# Patient Record
Sex: MALE | Race: BLACK OR AFRICAN AMERICAN | NOT HISPANIC OR LATINO | Employment: FULL TIME | URBAN - METROPOLITAN AREA
[De-identification: names, ages, dates, MRNs, and addresses within clinical notes are randomized per-mention and may not be internally consistent; named-entity substitution may affect disease eponyms.]

---

## 2017-07-17 DIAGNOSIS — F40.10 SOCIAL PHOBIA: ICD-10-CM

## 2017-07-17 DIAGNOSIS — F41.1 GAD (GENERALIZED ANXIETY DISORDER): ICD-10-CM

## 2017-07-19 ENCOUNTER — TELEPHONE (OUTPATIENT)
Dept: INTERNAL MEDICINE | Facility: CLINIC | Age: 37
End: 2017-07-19

## 2017-07-19 RX ORDER — LORAZEPAM 0.5 MG/1
TABLET ORAL
Qty: 30 TABLET | Refills: 0 | Status: SHIPPED | OUTPATIENT
Start: 2017-07-19 | End: 2018-05-07 | Stop reason: SDUPTHER

## 2017-07-19 NOTE — TELEPHONE ENCOUNTER
----- Message from Chula Ramirez sent at 7/18/2017  9:34 AM CDT -----  Contact: [phone]/ self   Pt its requesting lab work schedule prior to his physical on 09/29/17. Please advise

## 2017-08-31 DIAGNOSIS — F41.1 GAD (GENERALIZED ANXIETY DISORDER): ICD-10-CM

## 2017-09-01 RX ORDER — ESCITALOPRAM OXALATE 20 MG/1
TABLET ORAL
Qty: 30 TABLET | Refills: 0 | Status: SHIPPED | OUTPATIENT
Start: 2017-09-01 | End: 2017-10-02 | Stop reason: SDUPTHER

## 2017-09-21 DIAGNOSIS — Z00.00 ROUTINE GENERAL MEDICAL EXAMINATION AT A HEALTH CARE FACILITY: Primary | ICD-10-CM

## 2017-09-29 ENCOUNTER — LAB VISIT (OUTPATIENT)
Dept: LAB | Facility: HOSPITAL | Age: 37
End: 2017-09-29
Attending: INTERNAL MEDICINE
Payer: COMMERCIAL

## 2017-09-29 DIAGNOSIS — Z00.00 ROUTINE GENERAL MEDICAL EXAMINATION AT A HEALTH CARE FACILITY: ICD-10-CM

## 2017-09-29 LAB
ALBUMIN SERPL BCP-MCNC: 3.7 G/DL
ALP SERPL-CCNC: 54 U/L
ALT SERPL W/O P-5'-P-CCNC: 43 U/L
ANION GAP SERPL CALC-SCNC: 8 MMOL/L
AST SERPL-CCNC: 26 U/L
BASOPHILS # BLD AUTO: 0.02 K/UL
BASOPHILS NFR BLD: 0.5 %
BILIRUB SERPL-MCNC: 0.4 MG/DL
BUN SERPL-MCNC: 17 MG/DL
CALCIUM SERPL-MCNC: 8.9 MG/DL
CHLORIDE SERPL-SCNC: 106 MMOL/L
CHOLEST SERPL-MCNC: 148 MG/DL
CHOLEST/HDLC SERPL: 4.1 {RATIO}
CO2 SERPL-SCNC: 27 MMOL/L
CREAT SERPL-MCNC: 1 MG/DL
DIFFERENTIAL METHOD: ABNORMAL
EOSINOPHIL # BLD AUTO: 0.2 K/UL
EOSINOPHIL NFR BLD: 4.3 %
ERYTHROCYTE [DISTWIDTH] IN BLOOD BY AUTOMATED COUNT: 13.4 %
EST. GFR  (AFRICAN AMERICAN): >60 ML/MIN/1.73 M^2
EST. GFR  (NON AFRICAN AMERICAN): >60 ML/MIN/1.73 M^2
GLUCOSE SERPL-MCNC: 89 MG/DL
HCT VFR BLD AUTO: 40.6 %
HDLC SERPL-MCNC: 36 MG/DL
HDLC SERPL: 24.3 %
HGB BLD-MCNC: 13.7 G/DL
LDLC SERPL CALC-MCNC: 96.8 MG/DL
LYMPHOCYTES # BLD AUTO: 2.3 K/UL
LYMPHOCYTES NFR BLD: 54 %
MCH RBC QN AUTO: 28.1 PG
MCHC RBC AUTO-ENTMCNC: 33.7 G/DL
MCV RBC AUTO: 83 FL
MONOCYTES # BLD AUTO: 0.3 K/UL
MONOCYTES NFR BLD: 7.6 %
NEUTROPHILS # BLD AUTO: 1.4 K/UL
NEUTROPHILS NFR BLD: 33.1 %
NONHDLC SERPL-MCNC: 112 MG/DL
PLATELET # BLD AUTO: 248 K/UL
PMV BLD AUTO: 10.1 FL
POTASSIUM SERPL-SCNC: 4 MMOL/L
PROT SERPL-MCNC: 6.9 G/DL
RBC # BLD AUTO: 4.88 M/UL
SODIUM SERPL-SCNC: 141 MMOL/L
TRIGL SERPL-MCNC: 76 MG/DL
TSH SERPL DL<=0.005 MIU/L-ACNC: 1.07 UIU/ML
WBC # BLD AUTO: 4.22 K/UL

## 2017-09-29 PROCEDURE — 85025 COMPLETE CBC W/AUTO DIFF WBC: CPT

## 2017-09-29 PROCEDURE — 84443 ASSAY THYROID STIM HORMONE: CPT

## 2017-09-29 PROCEDURE — 36415 COLL VENOUS BLD VENIPUNCTURE: CPT | Mod: PO

## 2017-09-29 PROCEDURE — 80053 COMPREHEN METABOLIC PANEL: CPT

## 2017-09-29 PROCEDURE — 80061 LIPID PANEL: CPT

## 2017-10-02 ENCOUNTER — OFFICE VISIT (OUTPATIENT)
Dept: INTERNAL MEDICINE | Facility: CLINIC | Age: 37
End: 2017-10-02
Payer: COMMERCIAL

## 2017-10-02 VITALS
SYSTOLIC BLOOD PRESSURE: 124 MMHG | DIASTOLIC BLOOD PRESSURE: 80 MMHG | HEIGHT: 73 IN | BODY MASS INDEX: 36.02 KG/M2 | HEART RATE: 58 BPM | WEIGHT: 271.81 LBS

## 2017-10-02 DIAGNOSIS — I10 ESSENTIAL HYPERTENSION: ICD-10-CM

## 2017-10-02 DIAGNOSIS — Z00.00 PREVENTATIVE HEALTH CARE: Primary | ICD-10-CM

## 2017-10-02 DIAGNOSIS — F41.1 GAD (GENERALIZED ANXIETY DISORDER): ICD-10-CM

## 2017-10-02 DIAGNOSIS — Z12.5 SCREENING FOR PROSTATE CANCER: ICD-10-CM

## 2017-10-02 DIAGNOSIS — F40.10 SOCIAL PHOBIA: ICD-10-CM

## 2017-10-02 DIAGNOSIS — Z23 NEED FOR INFLUENZA VACCINATION: ICD-10-CM

## 2017-10-02 PROCEDURE — 90688 IIV4 VACCINE SPLT 0.5 ML IM: CPT | Mod: S$GLB,,, | Performed by: INTERNAL MEDICINE

## 2017-10-02 PROCEDURE — 90471 IMMUNIZATION ADMIN: CPT | Mod: S$GLB,,, | Performed by: INTERNAL MEDICINE

## 2017-10-02 PROCEDURE — 99395 PREV VISIT EST AGE 18-39: CPT | Mod: 25,S$GLB,, | Performed by: INTERNAL MEDICINE

## 2017-10-02 PROCEDURE — 99999 PR PBB SHADOW E&M-EST. PATIENT-LVL III: CPT | Mod: PBBFAC,,, | Performed by: INTERNAL MEDICINE

## 2017-10-02 RX ORDER — ESCITALOPRAM OXALATE 20 MG/1
TABLET ORAL
Qty: 30 TABLET | Refills: 11 | Status: SHIPPED | OUTPATIENT
Start: 2017-10-02 | End: 2018-09-16 | Stop reason: SDUPTHER

## 2017-10-02 RX ORDER — VERAPAMIL HYDROCHLORIDE 120 MG/1
120 TABLET, FILM COATED, EXTENDED RELEASE ORAL DAILY
Qty: 30 TABLET | Refills: 11 | Status: SHIPPED | OUTPATIENT
Start: 2017-10-02 | End: 2018-09-16 | Stop reason: SDUPTHER

## 2017-10-02 NOTE — PROGRESS NOTES
Subjective:       Patient ID: Paul Espinoza is a 37 y.o. male.    Chief Complaint: Annual Exam    HPI 37-year-old male presents to clinic today for annual physical exam and follow-up of hypertension and CHEMA.  He is compliant with medication denies side effects or issues.  Medications working well.  He does utilize Ativan very sporadic for social phobia including public speaking.  Medication helps him.  No side effects.  Review of Systems  otherwise negative  Objective:      Physical Exam  General: Well-appearing, well-nourished.  No distress  HEENT: conjunctivae are normal.  Pupils are equal and reative to light.  TM's are clear and intact bilaterally.  Hearing is grossly normal.  Nasopharynx is clear.  Oropharynx is clear.  Neck: Supple.  No thyroid megaly.  No bruits.  Lymph: No cervical or supraclavicular adenopathy.  Heart: Regular rate and rhythm, without murmur, rub or gallop.  Lungs: Clear to auscultation; respiratory effort normal.  Abdomen: Soft, nontender, nondistended.  Normoactive bowel sounds.  No hepatomegaly.  No masses.  Extremities: Good distal pulses.  No edema.  Psych: Oriented to time person place.  Judgment and insight seem unimpaired.  Mood and affect are appropriate.  Assessment:       1. Preventative health care    2. Need for influenza vaccination    3. Essential hypertension    4. CHEMA (generalized anxiety disorder)    5. Social phobia    6. Obesity, Class II, BMI 35-39.9, with comorbidity    7. Screening for prostate cancer        Plan:       Paul was seen today for annual exam.    Diagnoses and all orders for this visit:    Preventative health care  -     CBC auto differential; Standing  -     Comprehensive metabolic panel; Standing  -     Lipid panel; Standing  -     TSH; Standing    Need for influenza vaccination  -     Influenza - Quadrivalent (3 years & older)    Essential hypertension  -     verapamil (CALAN-SR) 120 MG CR tablet; Take 1 tablet (120 mg total) by mouth once  daily.  Controlled.  Continue current medical regimen.  Prescription refills addressed.  Followup advised. See after visit summary.  CHEMA (generalized anxiety disorder)  -     escitalopram oxalate (LEXAPRO) 20 MG tablet; TAKE 1 TABLET(20 MG) BY MOUTH EVERY DAY    Social phobia  Controlled.  Continue current medical regimen.  Prescription refills addressed.  Followup advised. See after visit summary.  Use of Ativan as needed sporadically.  Obesity, Class II, BMI 35-39.9, with comorbidity  Counseled on weight loss  Screening for prostate cancer  -     PSA, Screening; Standing

## 2018-05-07 DIAGNOSIS — F41.1 GAD (GENERALIZED ANXIETY DISORDER): ICD-10-CM

## 2018-05-07 DIAGNOSIS — F40.10 SOCIAL PHOBIA: ICD-10-CM

## 2018-05-07 RX ORDER — LORAZEPAM 0.5 MG/1
0.5 TABLET ORAL DAILY PRN
Qty: 30 TABLET | Refills: 3 | Status: SHIPPED | OUTPATIENT
Start: 2018-05-07 | End: 2019-03-16 | Stop reason: SDUPTHER

## 2018-09-16 DIAGNOSIS — F41.1 GAD (GENERALIZED ANXIETY DISORDER): ICD-10-CM

## 2018-09-16 DIAGNOSIS — I10 ESSENTIAL HYPERTENSION: ICD-10-CM

## 2018-09-17 RX ORDER — ESCITALOPRAM OXALATE 20 MG/1
TABLET ORAL
Qty: 30 TABLET | Refills: 3 | Status: SHIPPED | OUTPATIENT
Start: 2018-09-17 | End: 2019-01-04 | Stop reason: SDUPTHER

## 2018-09-17 RX ORDER — VERAPAMIL HYDROCHLORIDE 120 MG/1
TABLET, FILM COATED, EXTENDED RELEASE ORAL
Qty: 30 TABLET | Refills: 3 | Status: SHIPPED | OUTPATIENT
Start: 2018-09-17 | End: 2019-01-04 | Stop reason: SDUPTHER

## 2018-10-01 ENCOUNTER — LAB VISIT (OUTPATIENT)
Dept: LAB | Facility: HOSPITAL | Age: 38
End: 2018-10-01
Attending: INTERNAL MEDICINE
Payer: COMMERCIAL

## 2018-10-01 DIAGNOSIS — Z12.5 SCREENING FOR PROSTATE CANCER: ICD-10-CM

## 2018-10-01 DIAGNOSIS — Z00.00 PREVENTATIVE HEALTH CARE: ICD-10-CM

## 2018-10-01 LAB
ALBUMIN SERPL BCP-MCNC: 3.8 G/DL
ALP SERPL-CCNC: 51 U/L
ALT SERPL W/O P-5'-P-CCNC: 42 U/L
ANION GAP SERPL CALC-SCNC: 8 MMOL/L
AST SERPL-CCNC: 25 U/L
BASOPHILS # BLD AUTO: 0.02 K/UL
BASOPHILS NFR BLD: 0.5 %
BILIRUB SERPL-MCNC: 0.4 MG/DL
BUN SERPL-MCNC: 17 MG/DL
CALCIUM SERPL-MCNC: 9 MG/DL
CHLORIDE SERPL-SCNC: 105 MMOL/L
CHOLEST SERPL-MCNC: 134 MG/DL
CHOLEST/HDLC SERPL: 3.8 {RATIO}
CO2 SERPL-SCNC: 27 MMOL/L
COMPLEXED PSA SERPL-MCNC: 0.75 NG/ML
CREAT SERPL-MCNC: 0.9 MG/DL
DIFFERENTIAL METHOD: ABNORMAL
EOSINOPHIL # BLD AUTO: 0.3 K/UL
EOSINOPHIL NFR BLD: 7.3 %
ERYTHROCYTE [DISTWIDTH] IN BLOOD BY AUTOMATED COUNT: 13.5 %
EST. GFR  (AFRICAN AMERICAN): >60 ML/MIN/1.73 M^2
EST. GFR  (NON AFRICAN AMERICAN): >60 ML/MIN/1.73 M^2
GLUCOSE SERPL-MCNC: 87 MG/DL
HCT VFR BLD AUTO: 42.9 %
HDLC SERPL-MCNC: 35 MG/DL
HDLC SERPL: 26.1 %
HGB BLD-MCNC: 14 G/DL
IMM GRANULOCYTES # BLD AUTO: 0.02 K/UL
IMM GRANULOCYTES NFR BLD AUTO: 0.5 %
LDLC SERPL CALC-MCNC: 82.4 MG/DL
LYMPHOCYTES # BLD AUTO: 2.2 K/UL
LYMPHOCYTES NFR BLD: 52 %
MCH RBC QN AUTO: 28 PG
MCHC RBC AUTO-ENTMCNC: 32.6 G/DL
MCV RBC AUTO: 86 FL
MONOCYTES # BLD AUTO: 0.4 K/UL
MONOCYTES NFR BLD: 10.2 %
NEUTROPHILS # BLD AUTO: 1.3 K/UL
NEUTROPHILS NFR BLD: 29.5 %
NONHDLC SERPL-MCNC: 99 MG/DL
NRBC BLD-RTO: 0 /100 WBC
PLATELET # BLD AUTO: 249 K/UL
PMV BLD AUTO: 10.7 FL
POTASSIUM SERPL-SCNC: 4.1 MMOL/L
PROT SERPL-MCNC: 7 G/DL
RBC # BLD AUTO: 5 M/UL
SODIUM SERPL-SCNC: 140 MMOL/L
TRIGL SERPL-MCNC: 83 MG/DL
TSH SERPL DL<=0.005 MIU/L-ACNC: 1.68 UIU/ML
WBC # BLD AUTO: 4.23 K/UL

## 2018-10-01 PROCEDURE — 85025 COMPLETE CBC W/AUTO DIFF WBC: CPT

## 2018-10-01 PROCEDURE — 80061 LIPID PANEL: CPT

## 2018-10-01 PROCEDURE — 80053 COMPREHEN METABOLIC PANEL: CPT

## 2018-10-01 PROCEDURE — 84153 ASSAY OF PSA TOTAL: CPT

## 2018-10-01 PROCEDURE — 36415 COLL VENOUS BLD VENIPUNCTURE: CPT | Mod: PO

## 2018-10-01 PROCEDURE — 84443 ASSAY THYROID STIM HORMONE: CPT

## 2018-10-03 ENCOUNTER — OFFICE VISIT (OUTPATIENT)
Dept: INTERNAL MEDICINE | Facility: CLINIC | Age: 38
End: 2018-10-03
Payer: COMMERCIAL

## 2018-10-03 VITALS
SYSTOLIC BLOOD PRESSURE: 128 MMHG | BODY MASS INDEX: 36.9 KG/M2 | DIASTOLIC BLOOD PRESSURE: 70 MMHG | HEART RATE: 61 BPM | HEIGHT: 73 IN | WEIGHT: 278.44 LBS

## 2018-10-03 DIAGNOSIS — Z00.00 PREVENTATIVE HEALTH CARE: Primary | ICD-10-CM

## 2018-10-03 DIAGNOSIS — E66.9 OBESITY (BMI 35.0-39.9 WITHOUT COMORBIDITY): ICD-10-CM

## 2018-10-03 DIAGNOSIS — I10 ESSENTIAL HYPERTENSION: ICD-10-CM

## 2018-10-03 DIAGNOSIS — F41.1 GAD (GENERALIZED ANXIETY DISORDER): ICD-10-CM

## 2018-10-03 PROCEDURE — 90471 IMMUNIZATION ADMIN: CPT | Mod: S$GLB,,, | Performed by: INTERNAL MEDICINE

## 2018-10-03 PROCEDURE — 99999 PR PBB SHADOW E&M-EST. PATIENT-LVL III: CPT | Mod: PBBFAC,,, | Performed by: INTERNAL MEDICINE

## 2018-10-03 PROCEDURE — 3074F SYST BP LT 130 MM HG: CPT | Mod: CPTII,S$GLB,, | Performed by: INTERNAL MEDICINE

## 2018-10-03 PROCEDURE — 3078F DIAST BP <80 MM HG: CPT | Mod: CPTII,S$GLB,, | Performed by: INTERNAL MEDICINE

## 2018-10-03 PROCEDURE — 90686 IIV4 VACC NO PRSV 0.5 ML IM: CPT | Mod: S$GLB,,, | Performed by: INTERNAL MEDICINE

## 2018-10-03 PROCEDURE — 99396 PREV VISIT EST AGE 40-64: CPT | Mod: 25,S$GLB,, | Performed by: INTERNAL MEDICINE

## 2018-10-03 NOTE — PROGRESS NOTES
Subjective:       Patient ID: Paul Espinoza is a 38 y.o. male.    Chief Complaint: Annual Exam    HPI 38-year-old male presents to clinic today for annual physical exam follow-up hypertension in [place] and social phobia.  Compliant with medicines denies side effects or issues.  He states his blood pressures been doing fine in the outpatient setting.  Review of Systems  otherwise negative  Objective:      Physical Exam  General: Well-appearing, well-nourished.  No distress  HEENT: conjunctivae are normal.  Pupils are equal and reative to light.  TM's are clear and intact bilaterally.  Hearing is grossly normal.  Nasopharynx is clear.  Oropharynx is clear.  Neck: Supple.  No thyroid megaly.  No bruits.  Lymph: No cervical or supraclavicular adenopathy.  Heart: Regular rate and rhythm, without murmur, rub or gallop.  Lungs: Clear to auscultation; respiratory effort normal.  Abdomen: Soft, nontender, nondistended.  Normoactive bowel sounds.  No hepatomegaly.  No masses.  Extremities: Good distal pulses.  No edema.  Psych: Oriented to time person place.  Judgment and insight seem unimpaired.  Mood and affect are appropriate.  Assessment:       1. Preventative health care    2. Essential hypertension    3. CHEMA (generalized anxiety disorder)    4. Obesity (BMI 35.0-39.9 without comorbidity)        Plan:       Paul was seen today for annual exam.    Diagnoses and all orders for this visit:    Preventative health care  Performed reviewed and updated  Essential hypertension  Today Controlled.  Continue current medical regimen.  Prescription refills addressed.  Followup advised. See after visit summary.  Monitor in the outpatient setting and update me   CHEMA (generalized anxiety disorder)  Controlled.  Continue current medical regimen.  Prescription refills addressed.  Followup advised. See after visit summary.  Obesity (BMI 35.0-39.9 without comorbidity)  Counseled on weight loss patient is currently on exercise regimen in eating  healthier.  Other orders  -     Influenza - Quadrivalent (3 years & older) (PF)

## 2018-12-22 ENCOUNTER — PATIENT MESSAGE (OUTPATIENT)
Dept: INTERNAL MEDICINE | Facility: CLINIC | Age: 38
End: 2018-12-22

## 2018-12-22 DIAGNOSIS — N52.9 ERECTILE DYSFUNCTION, UNSPECIFIED ERECTILE DYSFUNCTION TYPE: Primary | ICD-10-CM

## 2018-12-26 RX ORDER — SILDENAFIL 100 MG/1
100 TABLET, FILM COATED ORAL DAILY PRN
Qty: 10 TABLET | Refills: 11 | Status: SHIPPED | OUTPATIENT
Start: 2018-12-26 | End: 2019-07-15 | Stop reason: SDUPTHER

## 2019-01-04 DIAGNOSIS — I10 ESSENTIAL HYPERTENSION: ICD-10-CM

## 2019-01-04 DIAGNOSIS — F41.1 GAD (GENERALIZED ANXIETY DISORDER): ICD-10-CM

## 2019-01-04 RX ORDER — VERAPAMIL HYDROCHLORIDE 120 MG/1
TABLET, FILM COATED, EXTENDED RELEASE ORAL
Qty: 30 TABLET | Refills: 0 | Status: SHIPPED | OUTPATIENT
Start: 2019-01-04 | End: 2019-02-01 | Stop reason: SDUPTHER

## 2019-01-04 RX ORDER — ESCITALOPRAM OXALATE 20 MG/1
TABLET ORAL
Qty: 30 TABLET | Refills: 0 | Status: SHIPPED | OUTPATIENT
Start: 2019-01-04 | End: 2019-01-30 | Stop reason: SDUPTHER

## 2019-01-30 DIAGNOSIS — F41.1 GAD (GENERALIZED ANXIETY DISORDER): ICD-10-CM

## 2019-02-01 DIAGNOSIS — I10 ESSENTIAL HYPERTENSION: ICD-10-CM

## 2019-02-01 RX ORDER — ESCITALOPRAM OXALATE 20 MG/1
TABLET ORAL
Qty: 30 TABLET | Refills: 3 | Status: SHIPPED | OUTPATIENT
Start: 2019-02-01 | End: 2019-06-17 | Stop reason: SDUPTHER

## 2019-02-04 ENCOUNTER — PATIENT MESSAGE (OUTPATIENT)
Dept: INTERNAL MEDICINE | Facility: CLINIC | Age: 39
End: 2019-02-04

## 2019-02-04 ENCOUNTER — TELEPHONE (OUTPATIENT)
Dept: INTERNAL MEDICINE | Facility: CLINIC | Age: 39
End: 2019-02-04

## 2019-02-04 RX ORDER — VERAPAMIL HYDROCHLORIDE 120 MG/1
TABLET, FILM COATED, EXTENDED RELEASE ORAL
Qty: 30 TABLET | Refills: 3 | Status: SHIPPED | OUTPATIENT
Start: 2019-02-04 | End: 2019-05-31

## 2019-03-16 DIAGNOSIS — F41.1 GAD (GENERALIZED ANXIETY DISORDER): ICD-10-CM

## 2019-03-16 DIAGNOSIS — F40.10 SOCIAL PHOBIA: ICD-10-CM

## 2019-03-18 RX ORDER — LORAZEPAM 0.5 MG/1
TABLET ORAL
Qty: 30 TABLET | Refills: 0 | Status: SHIPPED | OUTPATIENT
Start: 2019-03-18 | End: 2020-02-14

## 2019-05-31 ENCOUNTER — OFFICE VISIT (OUTPATIENT)
Dept: INTERNAL MEDICINE | Facility: CLINIC | Age: 39
End: 2019-05-31
Payer: COMMERCIAL

## 2019-05-31 VITALS
WEIGHT: 287.69 LBS | HEIGHT: 73 IN | OXYGEN SATURATION: 98 % | HEART RATE: 61 BPM | DIASTOLIC BLOOD PRESSURE: 102 MMHG | BODY MASS INDEX: 38.13 KG/M2 | SYSTOLIC BLOOD PRESSURE: 152 MMHG

## 2019-05-31 DIAGNOSIS — I10 ESSENTIAL HYPERTENSION: Primary | ICD-10-CM

## 2019-05-31 PROCEDURE — 99999 PR PBB SHADOW E&M-EST. PATIENT-LVL III: ICD-10-PCS | Mod: PBBFAC,,, | Performed by: INTERNAL MEDICINE

## 2019-05-31 PROCEDURE — 3008F PR BODY MASS INDEX (BMI) DOCUMENTED: ICD-10-PCS | Mod: CPTII,S$GLB,, | Performed by: INTERNAL MEDICINE

## 2019-05-31 PROCEDURE — 99999 PR PBB SHADOW E&M-EST. PATIENT-LVL III: CPT | Mod: PBBFAC,,, | Performed by: INTERNAL MEDICINE

## 2019-05-31 PROCEDURE — 3080F DIAST BP >= 90 MM HG: CPT | Mod: CPTII,S$GLB,, | Performed by: INTERNAL MEDICINE

## 2019-05-31 PROCEDURE — 99213 PR OFFICE/OUTPT VISIT, EST, LEVL III, 20-29 MIN: ICD-10-PCS | Mod: S$GLB,,, | Performed by: INTERNAL MEDICINE

## 2019-05-31 PROCEDURE — 3008F BODY MASS INDEX DOCD: CPT | Mod: CPTII,S$GLB,, | Performed by: INTERNAL MEDICINE

## 2019-05-31 PROCEDURE — 3077F SYST BP >= 140 MM HG: CPT | Mod: CPTII,S$GLB,, | Performed by: INTERNAL MEDICINE

## 2019-05-31 PROCEDURE — 99213 OFFICE O/P EST LOW 20 MIN: CPT | Mod: S$GLB,,, | Performed by: INTERNAL MEDICINE

## 2019-05-31 PROCEDURE — 3080F PR MOST RECENT DIASTOLIC BLOOD PRESSURE >= 90 MM HG: ICD-10-PCS | Mod: CPTII,S$GLB,, | Performed by: INTERNAL MEDICINE

## 2019-05-31 PROCEDURE — 3077F PR MOST RECENT SYSTOLIC BLOOD PRESSURE >= 140 MM HG: ICD-10-PCS | Mod: CPTII,S$GLB,, | Performed by: INTERNAL MEDICINE

## 2019-05-31 RX ORDER — VERAPAMIL HYDROCHLORIDE 180 MG/1
TABLET, FILM COATED, EXTENDED RELEASE ORAL
Qty: 90 TABLET | Refills: 1 | Status: SHIPPED | OUTPATIENT
Start: 2019-05-31 | End: 2019-05-31

## 2019-05-31 RX ORDER — VERAPAMIL HYDROCHLORIDE 180 MG/1
180 TABLET, FILM COATED, EXTENDED RELEASE ORAL DAILY
Qty: 90 TABLET | Refills: 1 | Status: SHIPPED | OUTPATIENT
Start: 2019-05-31 | End: 2020-01-28 | Stop reason: SDUPTHER

## 2019-05-31 NOTE — PROGRESS NOTES
Subjective:       Patient ID: Paul Espinoza is a 39 y.o. male.    Chief Complaint: Follow-up (righ thumb pain x1mo)    HPI 39-year-old male presents to clinic today reporting that when he was doing the treadmill a jump around month ago he slightly bent his thumb back and he has had some soreness and pain since then.  It has gradually improved but has not resolved.  Noticed it mostly with hand shakes.  Also his blood pressure is running higher today he has been working out doing more weight lifting and gym attendance.  He has also been doing caffeine and energy drinks.  He is asymptomatic.  Review of Systems  otherwise negative  Objective:      Physical Exam  General: Well-appearing, well-nourished.  No distress  HEENT: conjunctivae are normal.  Pupils are equal and reative to light.  TM's are clear and intact bilaterally.  Hearing is grossly normal.  Nasopharynx is clear.  Oropharynx is clear.  Neck: Supple.  No thyroid megaly.  No bruits.  Lymph: No cervical or supraclavicular adenopathy.  Heart: Regular rate and rhythm, without murmur, rub or gallop.  Lungs: Clear to auscultation; respiratory effort normal.  Abdomen: Soft, nontender, nondistended.  Normoactive bowel sounds.  No hepatomegaly.  No masses.  Extremities: Good distal pulses.  No edema.  Psych: Oriented to time person place.  Judgment and insight seem unimpaired.  Mood and affect are appropriate.  Assessment:       1. Essential hypertension    2. Strain of tendon of thumb        Plan:       Paul was seen today for follow-up.    Diagnoses and all orders for this visit:    Essential hypertension  -     verapamil (CALAN-SR) 180 MG CR tablet; TAKE 1 TABLET BY MOUTH EVERY DAY  Medication dose increased.  Follow-up scheduled.  Strain of tendon of thumb  Recommend NSAIDs over the next couple weeks.  Since patient is having clinical improvement over the last few weeks suspect this is a strain injury that needs a little bit more time.  He still has range of motion  and function.  If this is persistent or becomes more problematic consider hand orthopedic referral.

## 2019-06-14 ENCOUNTER — PATIENT MESSAGE (OUTPATIENT)
Dept: ADMINISTRATIVE | Facility: OTHER | Age: 39
End: 2019-06-14

## 2019-06-14 ENCOUNTER — PATIENT MESSAGE (OUTPATIENT)
Dept: INTERNAL MEDICINE | Facility: CLINIC | Age: 39
End: 2019-06-14

## 2019-06-14 DIAGNOSIS — I10 ESSENTIAL HYPERTENSION: Primary | ICD-10-CM

## 2019-06-17 DIAGNOSIS — F41.1 GAD (GENERALIZED ANXIETY DISORDER): ICD-10-CM

## 2019-06-17 RX ORDER — ESCITALOPRAM OXALATE 20 MG/1
TABLET ORAL
Qty: 30 TABLET | Refills: 0 | Status: SHIPPED | OUTPATIENT
Start: 2019-06-17 | End: 2019-07-17 | Stop reason: SDUPTHER

## 2019-06-26 ENCOUNTER — PATIENT OUTREACH (OUTPATIENT)
Dept: OTHER | Facility: OTHER | Age: 39
End: 2019-06-26

## 2019-06-26 NOTE — PROGRESS NOTES
Last 5 Patient Entered Readings                                      Current 30 Day Average: 140/92     Recent Readings 6/26/2019 6/25/2019 6/24/2019 6/21/2019 6/20/2019    SBP (mmHg) 140 140 152 144 122    DBP (mmHg) 98 98 92 91 81    Pulse 63 63 63 72 100        Digital Medicine: Health  Introduction    Introduced Mr. Paul Espinoza to Digital Medicine. Discussed health  role and recommended lifestyle modifications.    Lifestyle Assessment:  Current Dietary Habits(i.e. low sodium, food labels, dining out): Mr. Miller has recently cut back on red meats, carbs and soda and increased his intake of vegetables, greens, fish oils, garlic, beets, nuts and dark chocolate. He drinks 2 cups of coffee per day and does not eat out a lot. Patient prefers to eat home-cooked meals or just a turkey sandwich. Introduced low-sodium guidelines to Mr. Miller and encouraged nutrition label reading. Will continue to encourage cutting back on red meats and work with patient on low-sodium options and nutrition label reading.    Exercise: Patient reports running 2-3 times per week for 30-45 minutes. Encouraged patient to continue physical activity level and start thinking about increasing the amount. Will continue to work with patient on increasing physical activity.    Alcohol/Tobacco: Mr. Miller does not drink or smoke    Medication Adherence: has been compliant with the medicaiton regimen. Mr. Miller has no complaints about his medication routine. He has been taking his BP after his morning cup of coffee at work. Instructed patient to wait 1 hour after medications, caffeine, etc before taking BP reading.    Reviewed AHA/AACE recommendations:  Limit sodium intake to <2000mg/day  Recommended CHO intake, 45-65% of daily caloric intake  Perform 150 minutes of physical activity per week    Reviewed the importance of self-monitoring, medication adherence, and that the health  can be used as a resource for lifestyle modifications to help  reduce or maintain a healthy lifestyle.  Reviewed that the Digital Medicine team is not available for emergencies and instructed the patient to call 911 or [facility] ([phone] or [phone]) if one arises.

## 2019-07-14 PROCEDURE — 99091 PR DIGITAL MEDICINE SERVICES, HYPERTENSION, ESTABLISHED: ICD-10-PCS | Mod: ,,,

## 2019-07-14 PROCEDURE — 99091 COLLJ & INTERPJ DATA EA 30 D: CPT | Mod: ,,,

## 2019-07-15 ENCOUNTER — OFFICE VISIT (OUTPATIENT)
Dept: INTERNAL MEDICINE | Facility: CLINIC | Age: 39
End: 2019-07-15
Payer: COMMERCIAL

## 2019-07-15 VITALS
WEIGHT: 287.69 LBS | OXYGEN SATURATION: 97 % | HEIGHT: 73 IN | HEART RATE: 70 BPM | DIASTOLIC BLOOD PRESSURE: 90 MMHG | SYSTOLIC BLOOD PRESSURE: 128 MMHG | BODY MASS INDEX: 38.13 KG/M2

## 2019-07-15 DIAGNOSIS — Z00.00 PREVENTATIVE HEALTH CARE: Primary | ICD-10-CM

## 2019-07-15 DIAGNOSIS — N52.9 ERECTILE DYSFUNCTION, UNSPECIFIED ERECTILE DYSFUNCTION TYPE: ICD-10-CM

## 2019-07-15 DIAGNOSIS — I10 ESSENTIAL HYPERTENSION: ICD-10-CM

## 2019-07-15 PROCEDURE — 99213 PR OFFICE/OUTPT VISIT, EST, LEVL III, 20-29 MIN: ICD-10-PCS | Mod: S$GLB,,, | Performed by: INTERNAL MEDICINE

## 2019-07-15 PROCEDURE — 99213 OFFICE O/P EST LOW 20 MIN: CPT | Mod: S$GLB,,, | Performed by: INTERNAL MEDICINE

## 2019-07-15 PROCEDURE — 3080F PR MOST RECENT DIASTOLIC BLOOD PRESSURE >= 90 MM HG: ICD-10-PCS | Mod: CPTII,S$GLB,, | Performed by: INTERNAL MEDICINE

## 2019-07-15 PROCEDURE — 3080F DIAST BP >= 90 MM HG: CPT | Mod: CPTII,S$GLB,, | Performed by: INTERNAL MEDICINE

## 2019-07-15 PROCEDURE — 3008F PR BODY MASS INDEX (BMI) DOCUMENTED: ICD-10-PCS | Mod: CPTII,S$GLB,, | Performed by: INTERNAL MEDICINE

## 2019-07-15 PROCEDURE — 3074F SYST BP LT 130 MM HG: CPT | Mod: CPTII,S$GLB,, | Performed by: INTERNAL MEDICINE

## 2019-07-15 PROCEDURE — 3074F PR MOST RECENT SYSTOLIC BLOOD PRESSURE < 130 MM HG: ICD-10-PCS | Mod: CPTII,S$GLB,, | Performed by: INTERNAL MEDICINE

## 2019-07-15 PROCEDURE — 99999 PR PBB SHADOW E&M-EST. PATIENT-LVL III: CPT | Mod: PBBFAC,,, | Performed by: INTERNAL MEDICINE

## 2019-07-15 PROCEDURE — 99999 PR PBB SHADOW E&M-EST. PATIENT-LVL III: ICD-10-PCS | Mod: PBBFAC,,, | Performed by: INTERNAL MEDICINE

## 2019-07-15 PROCEDURE — 3008F BODY MASS INDEX DOCD: CPT | Mod: CPTII,S$GLB,, | Performed by: INTERNAL MEDICINE

## 2019-07-15 RX ORDER — SILDENAFIL 100 MG/1
100 TABLET, FILM COATED ORAL DAILY PRN
Qty: 30 TABLET | Refills: 11 | Status: SHIPPED | OUTPATIENT
Start: 2019-07-15 | End: 2023-08-21

## 2019-07-15 RX ORDER — LOSARTAN POTASSIUM 50 MG/1
50 TABLET ORAL DAILY
Qty: 90 TABLET | Refills: 1 | Status: SHIPPED | OUTPATIENT
Start: 2019-07-15 | End: 2019-12-13

## 2019-07-15 NOTE — PROGRESS NOTES
Subjective:       Patient ID: Paul Espinoza is a 39 y.o. male.    Chief Complaint: Follow-up and Hypertension    HPI 39-year-old male presents to clinic today for follow-up hypertension recent adjustment of medication.    Patient denies any side effects from medication.  Signed up for digital hypertension able to review his numbers.  Review of Systems  is otherwise negative  Objective:      Physical Exam  General: Well-appearing, well-nourished.  No distress  HEENT: conjunctivae are normal.  Pupils are equal and reative to light.  TM's are clear and intact bilaterally.  Hearing is grossly normal.  Nasopharynx is clear.  Oropharynx is clear.  Neck: Supple.  No thyroid megaly.  No bruits.  Lymph: No cervical or supraclavicular adenopathy.  Heart: Regular rate and rhythm, without murmur, rub or gallop.  Lungs: Clear to auscultation; respiratory effort normal.  Abdomen: Soft, nontender, nondistended.  Normoactive bowel sounds.  No hepatomegaly.  No masses.  Extremities: Good distal pulses.  No edema.  Psych: Oriented to time person place.  Judgment and insight seem unimpaired.  Mood and affect are appropriate.  Assessment:       1. Preventative health care    2. Essential hypertension    3. Erectile dysfunction, unspecified erectile dysfunction type        Plan:       Paul was seen today for follow-up and hypertension.    Diagnoses and all orders for this visit:    Preventative health care  -     CBC auto differential; Standing  -     Comprehensive metabolic panel; Standing  -     Lipid panel; Standing  -     PSA, Screening; Standing  -     TSH; Standing  Scheduled  Essential hypertension  -     losartan (COZAAR) 50 MG tablet; Take 1 tablet (50 mg total) by mouth once daily.  Discuss use benefit as well as potential adverse side effects continue digital hypertension  Erectile dysfunction, unspecified erectile dysfunction type  -     sildenafil (VIAGRA) 100 MG tablet; Take 1 tablet (100 mg total) by mouth daily as needed  for Erectile Dysfunction.  Refilled

## 2019-07-17 DIAGNOSIS — F41.1 GAD (GENERALIZED ANXIETY DISORDER): ICD-10-CM

## 2019-07-17 RX ORDER — ESCITALOPRAM OXALATE 20 MG/1
TABLET ORAL
Qty: 30 TABLET | Refills: 5 | Status: SHIPPED | OUTPATIENT
Start: 2019-07-17 | End: 2020-01-15

## 2019-07-26 ENCOUNTER — PATIENT OUTREACH (OUTPATIENT)
Dept: OTHER | Facility: OTHER | Age: 39
End: 2019-07-26

## 2019-07-26 NOTE — PROGRESS NOTES
Last 5 Patient Entered Readings                                      Current 30 Day Average: 139/95     Recent Readings 7/25/2019 7/18/2019 7/15/2019 7/14/2019 7/9/2019    SBP (mmHg) 141 135 143 144 127    DBP (mmHg) 91 94 93 100 96    Pulse 72 69 63 72 68          Digital Medicine: Health  Follow Up    Left voicemail to follow up with  Paul Alexis.  Current BP average 139/95 mmHg is not at goal, <130/80.

## 2019-07-29 NOTE — PROGRESS NOTES
"Last 5 Patient Entered Readings                                      Current 30 Day Average: 138/94     Recent Readings 7/25/2019 7/18/2019 7/15/2019 7/14/2019 7/9/2019    SBP (mmHg) 141 135 143 144 127    DBP (mmHg) 91 94 93 100 96    Pulse 72 69 63 72 68        Digital Medicine: Health  Follow Up    Lifestyle Modifications:    1.Dietary Modifications (Sodium intake <2,000mg/day, food labels, dining out): Mr. Miller has been watching his sodium intake and has switched up his meal routine. He drinks 2 vegan meal replacement smoothies from smoothie ian per day and then eats 1 regular meal. His meal is normally a turkey sandwich with oat bread, american cheese and packaged turkey. Encouraged patient to use low-sodium cheeses and deli-meat on his sandwich and he agreed. Will continue monitoring patient's meal choices.    2.Physical Activity: Mr. Miller has been exercising 3-4 times per week and reports that he can tell a difference in how he feels and how he looks. Will continue encouraging exercise routine.      3.Medication Therapy: Patient has been compliant with the medication regimen. Mr. Miller does not have any questions concerning his BP readings or medication. He has been taking his medication in the morning and night and "feels good". He has been waiting 1 hour after BP medication before taking his BP reading along with waiting before having his morning coffee so it doesn't affect his readings.    4.Patient has the following medication side effects/concerns: Patient is a little concerned that he is noticing a difference in how he is feeling and looking but not in his readings.   (Frequency/Alleviating factors/Precipitating factors, etc.)     Follow up with Mr. Paul Espinoza completed. No further questions or concerns. Will continue to follow up to achieve health goals.      "

## 2019-08-04 PROCEDURE — 99091 PR DIGITAL MEDICINE SERVICES, HYPERTENSION, ESTABLISHED: ICD-10-PCS | Mod: ,,,

## 2019-08-04 PROCEDURE — 99091 COLLJ & INTERPJ DATA EA 30 D: CPT | Mod: ,,,

## 2019-08-29 ENCOUNTER — PATIENT OUTREACH (OUTPATIENT)
Dept: OTHER | Facility: OTHER | Age: 39
End: 2019-08-29

## 2019-08-29 NOTE — PROGRESS NOTES
Last 5 Patient Entered Readings                                      Current 30 Day Average: 135/92     Recent Readings 8/25/2019 8/16/2019 8/16/2019 8/11/2019 8/5/2019    SBP (mmHg) 114 146 147 135 135    DBP (mmHg) 76 95 104 90 91    Pulse 69 62 55 72 73      Digital Medicine: Health  Follow Up  Lifestyle Modifications:    1.Dietary Modifications (Sodium intake <2,000mg/day, food labels, dining out): Paul is watching the foods he is eating as well as his sodium intake. Patient has decreased his [employer] visits from twice per day to once and also realized his smoothie of choice was high in sodium. Patient changed which smoothie he gets and states he notices a difference in his BP readings. Patient states he is still drinking a lot of water and denies the need for recipes or resources at this time.    2.Physical Activity: Patient has been exercising every 2 days and attributes his lower reading on 8/25 to working out that day.     3.Medication Therapy: Patient has been compliant with the medication regimen. Paul does not have any questions concerning his BP medication and is happy with his downward trending readings. Patient confirms he has been following correct BP technique and agreed to take more frequent BP readings.     4.Patient has the following medication side effects/concerns: None  (Frequency/Alleviating factors/Precipitating factors, etc.)     Follow up with Mr. Paul Espinoza completed. No further questions or concerns. Will continue to follow up to achieve health goals.

## 2019-09-01 PROCEDURE — 99091 COLLJ & INTERPJ DATA EA 30 D: CPT | Mod: ,,,

## 2019-09-01 PROCEDURE — 99091 PR DIGITAL MEDICINE SERVICES, HYPERTENSION, ESTABLISHED: ICD-10-PCS | Mod: ,,,

## 2019-10-03 ENCOUNTER — PATIENT OUTREACH (OUTPATIENT)
Dept: OTHER | Facility: OTHER | Age: 39
End: 2019-10-03

## 2019-10-03 NOTE — PROGRESS NOTES
Digital Medicine: Health  Follow-Up  Patient has been working nights the past month but stops night shifts next week and is hoping readings will improve.    The history is provided by the patient.         Diet:   Patient reports eating or drinking the following: fruit, fresh vegetables and smoothiesHe has the following dietary restrictions: low sodium diet    Physical Activity:   When asked if exercising, patient responded: yes2 day(s) a week.  His level of intensity when exercising is low.    Patient participates in the following activities: walking, running and body weight exercises and treadmill    Patient plans to increase his activity routine next week once he is off night shift.     Intervention(s): goal setting and exercise ideas     Assigning the following patient goal(s): increase physical activity    SDOH Deferred  INTERVENTION(S)  recommended diet modifications, recommend physical activity, encouragement/support and goal setting    PLAN  patient verbalizes understanding, demonstrates understanding via teach back and patient amenable to changes    There are no preventive care reminders to display for this patient.    Last 5 Patient Entered Readings                                      Current 30 Day Average: 140/92     Recent Readings 9/30/2019 9/23/2019 9/17/2019 9/13/2019 9/8/2019    SBP (mmHg) 136 133 142 145 144    DBP (mmHg) 84 87 91 94 91    Pulse 57 76 69 66 64

## 2019-10-17 ENCOUNTER — PATIENT OUTREACH (OUTPATIENT)
Dept: OTHER | Facility: OTHER | Age: 39
End: 2019-10-17

## 2019-11-14 DIAGNOSIS — I10 ESSENTIAL HYPERTENSION: ICD-10-CM

## 2019-11-15 RX ORDER — VERAPAMIL HYDROCHLORIDE 180 MG/1
TABLET, FILM COATED, EXTENDED RELEASE ORAL
Qty: 90 TABLET | Refills: 0 | Status: SHIPPED | OUTPATIENT
Start: 2019-11-15 | End: 2020-07-13

## 2019-12-04 NOTE — PROGRESS NOTES
"Digital Medicine: Health  Follow-Up    Patient attributes missing BP readings to "working a lot" but he plans to take a BP reading today to continue with the digital med program.   Patient states he is feeling okay with no questions and that the morning time is best to reach him. Patient confirms confidence in correct BP reading technique and timing. Agreed to f/u in 2 weeks once patient has submitted more readings.    The history is provided by the patient.     Follow Up  Follow-up reason(s): reading review      Readings are missing.     There are no preventive care reminders to display for this patient.    Last 5 Patient Entered Readings                                      Current 30 Day Average:      Recent Readings 10/26/2019 10/18/2019 10/18/2019 9/30/2019 9/23/2019    SBP (mmHg) 146 122 145 136 133    DBP (mmHg) 95 87 98 84 87    Pulse 76 72 64 57 76          "

## 2019-12-06 ENCOUNTER — LAB VISIT (OUTPATIENT)
Dept: LAB | Facility: HOSPITAL | Age: 39
End: 2019-12-06
Attending: INTERNAL MEDICINE
Payer: COMMERCIAL

## 2019-12-06 DIAGNOSIS — Z00.00 PREVENTATIVE HEALTH CARE: ICD-10-CM

## 2019-12-06 LAB
ALBUMIN SERPL BCP-MCNC: 3.8 G/DL (ref 3.5–5.2)
ALP SERPL-CCNC: 56 U/L (ref 55–135)
ALT SERPL W/O P-5'-P-CCNC: 40 U/L (ref 10–44)
ANION GAP SERPL CALC-SCNC: 9 MMOL/L (ref 8–16)
AST SERPL-CCNC: 21 U/L (ref 10–40)
BASOPHILS # BLD AUTO: 0.03 K/UL (ref 0–0.2)
BASOPHILS NFR BLD: 0.6 % (ref 0–1.9)
BILIRUB SERPL-MCNC: 0.6 MG/DL (ref 0.1–1)
BUN SERPL-MCNC: 15 MG/DL (ref 6–20)
CALCIUM SERPL-MCNC: 9.4 MG/DL (ref 8.7–10.5)
CHLORIDE SERPL-SCNC: 104 MMOL/L (ref 95–110)
CHOLEST SERPL-MCNC: 161 MG/DL (ref 120–199)
CHOLEST/HDLC SERPL: 3.9 {RATIO} (ref 2–5)
CO2 SERPL-SCNC: 29 MMOL/L (ref 23–29)
COMPLEXED PSA SERPL-MCNC: 0.76 NG/ML (ref 0–4)
CREAT SERPL-MCNC: 1 MG/DL (ref 0.5–1.4)
DIFFERENTIAL METHOD: ABNORMAL
EOSINOPHIL # BLD AUTO: 0.2 K/UL (ref 0–0.5)
EOSINOPHIL NFR BLD: 3.8 % (ref 0–8)
ERYTHROCYTE [DISTWIDTH] IN BLOOD BY AUTOMATED COUNT: 13.3 % (ref 11.5–14.5)
EST. GFR  (AFRICAN AMERICAN): >60 ML/MIN/1.73 M^2
EST. GFR  (NON AFRICAN AMERICAN): >60 ML/MIN/1.73 M^2
GLUCOSE SERPL-MCNC: 89 MG/DL (ref 70–110)
HCT VFR BLD AUTO: 45.6 % (ref 40–54)
HDLC SERPL-MCNC: 41 MG/DL (ref 40–75)
HDLC SERPL: 25.5 % (ref 20–50)
HGB BLD-MCNC: 14.8 G/DL (ref 14–18)
IMM GRANULOCYTES # BLD AUTO: 0.04 K/UL (ref 0–0.04)
IMM GRANULOCYTES NFR BLD AUTO: 0.8 % (ref 0–0.5)
LDLC SERPL CALC-MCNC: 97.6 MG/DL (ref 63–159)
LYMPHOCYTES # BLD AUTO: 2.6 K/UL (ref 1–4.8)
LYMPHOCYTES NFR BLD: 54.5 % (ref 18–48)
MCH RBC QN AUTO: 28 PG (ref 27–31)
MCHC RBC AUTO-ENTMCNC: 32.5 G/DL (ref 32–36)
MCV RBC AUTO: 86 FL (ref 82–98)
MONOCYTES # BLD AUTO: 0.5 K/UL (ref 0.3–1)
MONOCYTES NFR BLD: 10.9 % (ref 4–15)
NEUTROPHILS # BLD AUTO: 1.4 K/UL (ref 1.8–7.7)
NEUTROPHILS NFR BLD: 29.4 % (ref 38–73)
NONHDLC SERPL-MCNC: 120 MG/DL
NRBC BLD-RTO: 0 /100 WBC
PLATELET # BLD AUTO: 236 K/UL (ref 150–350)
PMV BLD AUTO: 10.9 FL (ref 9.2–12.9)
POTASSIUM SERPL-SCNC: 4.1 MMOL/L (ref 3.5–5.1)
PROT SERPL-MCNC: 7 G/DL (ref 6–8.4)
RBC # BLD AUTO: 5.28 M/UL (ref 4.6–6.2)
SODIUM SERPL-SCNC: 142 MMOL/L (ref 136–145)
TRIGL SERPL-MCNC: 112 MG/DL (ref 30–150)
TSH SERPL DL<=0.005 MIU/L-ACNC: 1.43 UIU/ML (ref 0.4–4)
WBC # BLD AUTO: 4.77 K/UL (ref 3.9–12.7)

## 2019-12-06 PROCEDURE — 80061 LIPID PANEL: CPT

## 2019-12-06 PROCEDURE — 36415 COLL VENOUS BLD VENIPUNCTURE: CPT | Mod: PO

## 2019-12-06 PROCEDURE — 84153 ASSAY OF PSA TOTAL: CPT

## 2019-12-06 PROCEDURE — 84443 ASSAY THYROID STIM HORMONE: CPT

## 2019-12-06 PROCEDURE — 85025 COMPLETE CBC W/AUTO DIFF WBC: CPT

## 2019-12-06 PROCEDURE — 80053 COMPREHEN METABOLIC PANEL: CPT

## 2019-12-13 ENCOUNTER — OFFICE VISIT (OUTPATIENT)
Dept: INTERNAL MEDICINE | Facility: CLINIC | Age: 39
End: 2019-12-13
Payer: COMMERCIAL

## 2019-12-13 VITALS
WEIGHT: 289.44 LBS | HEIGHT: 73 IN | SYSTOLIC BLOOD PRESSURE: 162 MMHG | HEART RATE: 79 BPM | OXYGEN SATURATION: 97 % | BODY MASS INDEX: 38.36 KG/M2 | DIASTOLIC BLOOD PRESSURE: 98 MMHG

## 2019-12-13 DIAGNOSIS — I10 ESSENTIAL HYPERTENSION: ICD-10-CM

## 2019-12-13 DIAGNOSIS — E66.9 OBESITY, CLASS II, BMI 35-39.9, ISOLATED (SEE ACTUAL BMI): Primary | ICD-10-CM

## 2019-12-13 DIAGNOSIS — F41.1 GAD (GENERALIZED ANXIETY DISORDER): ICD-10-CM

## 2019-12-13 PROBLEM — E66.812 OBESITY, CLASS II, BMI 35-39.9, ISOLATED (SEE ACTUAL BMI): Status: ACTIVE | Noted: 2017-10-02

## 2019-12-13 PROCEDURE — 99999 PR PBB SHADOW E&M-EST. PATIENT-LVL III: CPT | Mod: PBBFAC,,, | Performed by: INTERNAL MEDICINE

## 2019-12-13 PROCEDURE — 3077F PR MOST RECENT SYSTOLIC BLOOD PRESSURE >= 140 MM HG: ICD-10-PCS | Mod: CPTII,S$GLB,, | Performed by: INTERNAL MEDICINE

## 2019-12-13 PROCEDURE — 99396 PREV VISIT EST AGE 40-64: CPT | Mod: S$GLB,,, | Performed by: INTERNAL MEDICINE

## 2019-12-13 PROCEDURE — 3080F DIAST BP >= 90 MM HG: CPT | Mod: CPTII,S$GLB,, | Performed by: INTERNAL MEDICINE

## 2019-12-13 PROCEDURE — 3080F PR MOST RECENT DIASTOLIC BLOOD PRESSURE >= 90 MM HG: ICD-10-PCS | Mod: CPTII,S$GLB,, | Performed by: INTERNAL MEDICINE

## 2019-12-13 PROCEDURE — 99396 PR PREVENTIVE VISIT,EST,40-64: ICD-10-PCS | Mod: S$GLB,,, | Performed by: INTERNAL MEDICINE

## 2019-12-13 PROCEDURE — 3077F SYST BP >= 140 MM HG: CPT | Mod: CPTII,S$GLB,, | Performed by: INTERNAL MEDICINE

## 2019-12-13 PROCEDURE — 99999 PR PBB SHADOW E&M-EST. PATIENT-LVL III: ICD-10-PCS | Mod: PBBFAC,,, | Performed by: INTERNAL MEDICINE

## 2019-12-13 RX ORDER — LOSARTAN POTASSIUM 100 MG/1
100 TABLET ORAL DAILY
Qty: 90 TABLET | Refills: 3 | Status: SHIPPED | OUTPATIENT
Start: 2019-12-13 | End: 2020-01-27

## 2019-12-13 NOTE — PROGRESS NOTES
Police contacted to transport pt to [facility].    Subjective:       Patient ID: Paul Espinoza is a 39 y.o. male.    Chief Complaint: Annual Exam    HPI 39-year-old male presents to clinic today for annual physical exam.  Patient is also here to follow-up hypertension blood pressure is running high today he has also gained weight having a hard time living healthy lifestyle with his work schedule.  Anxiety controlled.  Review of Systems  otherwise negative  Objective:      Physical Exam  General: Well-appearing, well-nourished.  No distress  HEENT: conjunctivae are normal.  Pupils are equal and reative to light.  TM's are clear and intact bilaterally.  Hearing is grossly normal.  Nasopharynx is clear.  Oropharynx is clear.  Neck: Supple.  No thyroid megaly.  No bruits.  Lymph: No cervical or supraclavicular adenopathy.  Heart: Regular rate and rhythm, without murmur, rub or gallop.  Lungs: Clear to auscultation; respiratory effort normal.  Abdomen: Soft, nontender, nondistended.  Normoactive bowel sounds.  No hepatomegaly.  No masses.  Extremities: Good distal pulses.  No edema.  Psych: Oriented to time person place.  Judgment and insight seem unimpaired.  Mood and affect are appropriate.  Assessment:       1. Obesity, Class II, BMI 35-39.9, isolated (see actual BMI)    2. Essential hypertension    3. CHEMA (generalized anxiety disorder)      4. Preventive healthcare age-appropriate healthcare screening prevention for today  Plan:       Paul was seen today for annual exam.    Diagnoses and all orders for this visit:    Obesity, Class II, BMI 35-39.9, isolated (see actual BMI)  Recommend weight loss counseling provided today  Essential hypertension  -     losartan (COZAAR) 100 MG tablet; Take 1 tablet (100 mg total) by mouth once daily.  -     Comprehensive metabolic panel; Standing  -     Lipid panel; Standing  Medication increased from losartan 50 mg to 100 mg continue calcium channel blocker he is to contact me within the next 1-2 weeks regarding his blood pressure  control and advised him to use the digital hypertension program more frequently.  CHEMA (generalized anxiety disorder)  Controlled.  Continue current medical regimen.  Prescription refills addressed.  Followup advised. See after visit summary.

## 2019-12-18 ENCOUNTER — PATIENT OUTREACH (OUTPATIENT)
Dept: OTHER | Facility: OTHER | Age: 39
End: 2019-12-18

## 2019-12-18 DIAGNOSIS — I10 ESSENTIAL HYPERTENSION: Primary | ICD-10-CM

## 2020-01-02 NOTE — PROGRESS NOTES
Digital Medicine: Health  Follow-Up    HPI    Intervention/Plan    There are no preventive care reminders to display for this patient.    Last 5 Patient Entered Readings                                      Current 30 Day Average: 151/101     Recent Readings 1/2/2020 12/31/2019 12/24/2019 12/20/2019 12/16/2019    SBP (mmHg) 142 160 135 155 163    DBP (mmHg) 95 108 92 104 102    Pulse 66 71 93 75 76                  Screenings    SDOH

## 2020-01-02 NOTE — PROGRESS NOTES
"Digital Medicine: Health  Follow-Up    Patient is doing good at this time. He acknowledges his BP readings have been high but also states he sometimes takes his BP readings before his BP medication. Reviewed correct BP reading technique and timing and patient confirms understanding.   Patient states he has been off his normal routine due to working 13-15 hour shifts throughout the month of December but things should be back to normal now that the Holidays are over.    The history is provided by the patient.     Follow Up  Follow-up reason(s): reading review      Routine Education Topics: eating patterns, meal planning and physical activity      INTERVENTION(S)  recommended diet modifications, recommend physical activity, encouragement/support, goal setting and denied questions    PLAN  patient verbalizes understanding and continue monitoring    Patient plans to continue making lifestyle adjustments, especially now that his work schedule has settled down. Patient plans to focus on his dietary habits and start having smoothies for breakfast. Patient also set a goal to run the treadmill daily. Agreed to f/u in 3 weeks to discuss progress    There are no preventive care reminders to display for this patient.    Last 5 Patient Entered Readings                                      Current 30 Day Average: 151/101     Recent Readings 1/2/2020 12/31/2019 12/24/2019 12/20/2019 12/16/2019    SBP (mmHg) 142 160 135 155 163    DBP (mmHg) 95 108 92 104 102    Pulse 66 71 93 75 76              Diet Screening   Patient reports eating or drinking the following: Elaina has the following dietary restrictions: low sodium diet    Barriers to a Healthy Diet: time/convenience and holidays/special events    Patient states he suffered a small "set back" due to working 13-15 hour shifts during December. Patient was unable to focus on exercise or eating right.  Patient states now that his busy work schedule is finished, he plans to " get back on his dieting routine. Patient would like to start having smoothies for breakfast again and confirms he has a low sodium recipe along with already getting the ingredients. Reviewed the importance of limiting processed foods due to high sodium content and patient confirms understanding. Encouraged patient to focus on his dietary habits now that work has settled down and he agrees.     Intervention(s): portion control, meal planning, reducing sodium intake and reducing processed foods    Assigning the following patient goals: reduce portions, meal plan, maintain low sodium diet and grocery shopping/making a shopping list    Physical Activity Screening   When asked if exercising, patient responded: yes    He exercises for 60 minutes per day 7 day(s) a week.      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 He set a goal to run on the treadmill for 2mi per day and patient states he was already on the treadmill this morning. Encouraged routine and discussed the benefits of planning exercise out in advanced.     Intervention(s): goal setting     Assigning the following patient goal(s): increase physical activity    Medication Adherence Screening       Patient's PCP recently increased his Losartan to 100mg. Patient does not notice a difference yet but understands it may take a few weeks to see the full results.       SDOH

## 2020-01-07 DIAGNOSIS — I10 ESSENTIAL HYPERTENSION: ICD-10-CM

## 2020-01-08 RX ORDER — LOSARTAN POTASSIUM 50 MG/1
TABLET ORAL
Qty: 90 TABLET | Refills: 1 | OUTPATIENT
Start: 2020-01-08

## 2020-01-14 DIAGNOSIS — F41.1 GAD (GENERALIZED ANXIETY DISORDER): ICD-10-CM

## 2020-01-15 RX ORDER — ESCITALOPRAM OXALATE 20 MG/1
TABLET ORAL
Qty: 30 TABLET | Refills: 5 | Status: SHIPPED | OUTPATIENT
Start: 2020-01-15 | End: 2020-07-13 | Stop reason: SDUPTHER

## 2020-01-26 ENCOUNTER — PATIENT MESSAGE (OUTPATIENT)
Dept: ADMINISTRATIVE | Facility: OTHER | Age: 40
End: 2020-01-26

## 2020-01-26 ENCOUNTER — PATIENT MESSAGE (OUTPATIENT)
Dept: INTERNAL MEDICINE | Facility: CLINIC | Age: 40
End: 2020-01-26

## 2020-01-26 DIAGNOSIS — I10 ESSENTIAL HYPERTENSION: Primary | ICD-10-CM

## 2020-01-27 RX ORDER — VALSARTAN 160 MG/1
160 TABLET ORAL DAILY
Qty: 90 TABLET | Refills: 3 | Status: SHIPPED | OUTPATIENT
Start: 2020-01-27 | End: 2020-01-28

## 2020-01-28 ENCOUNTER — PATIENT MESSAGE (OUTPATIENT)
Dept: OTHER | Facility: OTHER | Age: 40
End: 2020-01-28

## 2020-01-28 ENCOUNTER — PATIENT MESSAGE (OUTPATIENT)
Dept: ADMINISTRATIVE | Facility: OTHER | Age: 40
End: 2020-01-28

## 2020-01-28 ENCOUNTER — TELEPHONE (OUTPATIENT)
Dept: INTERNAL MEDICINE | Facility: CLINIC | Age: 40
End: 2020-01-28

## 2020-01-28 DIAGNOSIS — I10 ESSENTIAL HYPERTENSION: ICD-10-CM

## 2020-01-28 RX ORDER — IRBESARTAN 150 MG/1
150 TABLET ORAL NIGHTLY
Qty: 90 TABLET | Refills: 1 | Status: SHIPPED | OUTPATIENT
Start: 2020-01-28 | End: 2020-05-11

## 2020-01-28 NOTE — PROGRESS NOTES
Digital Medicine: Clinician Follow-Up    Called patient for hypertension follow-up. Patient sent message to both PCP and [facility] Clinical PharmD requesting drug change due to blood pressure still being elevated after last medication change where losartan was increased to 100 mg. PCP Liliam Martinez switched patient from losartan to Diovan 160 mg daily. Patient reports he received email and picked up new medications but states that [facility] only gave him 5 tablets of valsartan and requested he come back for the remainder of his prescription after they obtained inventory. Patient was calling [facility] to follow-up on when he can receive remainder of his prescription. He is presently taking verapimil during the day and valsartan at night.    The history is provided by the patient. No  was used.     HYPERTENSION  Our goal is to get BP to consistently below 130/80mmHg and make the process convenient so patient can avoid extra trips to the office. Getting your blood pressure below 130/80mmHg (definition of control) will reduce your risk for heart attack, kidney failure, stroke and death (as well as kidney failure, eye disease, & dementia)      Reviewed non-pharmacologic therapies and impact on BP      Explained that we expect patient to obtain several blood pressures per week at random times of day.  Instructed patient not to allow anyone else to use phone and monitoring device.  Confirmed appropriate BP monitoring technique.      Explained to patient that the digital medicine team is not available for emergencies.  Patient will call [facility] on-call ([phone] or [phone]) or [phone] if needed.    Patient's BP goal is 130/80. Patients BP average is 150/98 mmHg, which is above goal, per 2017 ACC/AHA Hypertension Guidelines.      Med Review complete.    Allergies reviewed.      INTERVENTION(S)  reviewed appropriate dose schedule and encouragement/support    PLAN  patient verbalizes understanding,  patient amenable to changes and continue monitoring    Current 30-day BP avg of 150/98 is uncontrolled, does not meet goal of <130/80.  Reviewed readings: SBP/DBP is trending upwards where both are consistently elevated above goal.    Continue current regimen.  Patient to advise if [facility] will have remaining inventory of Diovan before current 5-day supply provided runs out. Diovan has been on critical nationwide shortage for several months. If [facility] is unable to obtain, consider switching patient to irbesartan or candesartan for BP management.  1/28/20 11:18AM: Patient emailed to confirm that pharmacy has remainder of prescription quantity available for pick-up.    Follow-up in 2 weeks.      There are no preventive care reminders to display for this patient.    Last 5 Patient Entered Readings                                      Current 30 Day Average: 150/98     Recent Readings 1/27/2020 1/27/2020 1/26/2020 1/26/2020 1/26/2020    SBP (mmHg) 148 151 152 153 160    DBP (mmHg) 97 96 102 97 102    Pulse 72 79 66 68 78             Hypertension Medications             valsartan (DIOVAN) 160 MG tablet Take 1 tablet (160 mg total) by mouth once daily.    verapamil (CALAN-SR) 180 MG CR tablet Take 1 tablet (180 mg total) by mouth once daily.    verapamil (CALAN-SR) 180 MG CR tablet TAKE 1 TABLET BY MOUTH EVERY DAY                 Screenings

## 2020-01-28 NOTE — PROGRESS NOTES
"Patient sent message stating that [facility] does not have the remainder of valsartan prescription as communicated. "Call center made a mistake." Patient requesting new prescription for alternative ARB that we discussed due to valsartan not being available.     Order placed for irbesartan 150 mg nightly to replace valsartan 160 mg daily.  Request MD to discontinue valsartan prescription.    Hypertension Medications             irbesartan (AVAPRO) 150 MG tablet Take 1 tablet (150 mg total) by mouth every evening.         verapamil (CALAN-SR) 180 MG CR tablet TAKE 1 TABLET BY MOUTH EVERY DAY          "

## 2020-01-28 NOTE — TELEPHONE ENCOUNTER
----- Message from Iris Cruz PharmD sent at 1/28/2020  3:31 PM CST -----  Hi [facility] does not have valsartan 160 mg available to fill prescription for Paul Alexis. I assume it is still on national shortage. I sent new prescription for irbesartan 150 mg nightly to [facility]. I cannot discontinue your current prescription for valsartan. Do you mind cancelling/discontinuing that order?    Kind regards,  Iris Cruz PharmD   Digital Medicine Clinical Pharmacist  [phone]

## 2020-01-29 ENCOUNTER — PATIENT OUTREACH (OUTPATIENT)
Dept: OTHER | Facility: OTHER | Age: 40
End: 2020-01-29

## 2020-01-29 NOTE — PROGRESS NOTES
Digital Medicine: Health  Follow-Up    Patient is doing good at this time but acknowledges his BP readings are still elevated. Patient confirms he is feeling good w/ no symptoms.    The history is provided by the patient.       INTERVENTION(S)  recommended diet modifications and encouragement/support    PLAN  patient verbalizes understanding, demonstrates understanding via teach back and continue monitoring    Patient plans to continue increasing his activity (treadmill) routine along with being more mindful of his sodium intake. Patient plans to start reading nutrition labels and choosing lower sodium choices. Will e-mail low sodium guidelines and f/u in another few weeks.    There are no preventive care reminders to display for this patient.    Last 5 Patient Entered Readings                                      Current 30 Day Average: 150/98     Recent Readings 1/27/2020 1/27/2020 1/26/2020 1/26/2020 1/26/2020    SBP (mmHg) 148 151 152 153 160    DBP (mmHg) 97 96 102 97 102    Pulse 72 79 66 68 78            Diet Screening       Eating style: confused about food/nutrition    Barriers to a Healthy Diet: lack of knowledge    Reviewed lifestyle influencers on DBP and patient was unaware how sodium can be so sneaky and impacts his BP. He is normally eating things like chicken/turkey, brown rice, wheat bread, almonds, crab, shrimp, jambalaya, etc. Reviewed daily sodium recommendation of 2000mg/day along with nutrition label reading. Also reviewed sodium content in crab cakes and jambalaya. Informed patient that sodium can be sneaky and to limit fried/fask food and processed foods and he now confirms understanding. Patient confirms he is now comfortable looking at a nutrition label and choosing a lower sodium option.    Intervention(s): low sodium diet education, reducing sodium intake, reading food labels, sodium reduction while dining out, reducing processed foods, reducing dining out, reducing seasonings and  DASH diet    Assigning the following patient goals: meal plan and maintain low sodium diet    Physical Activity Screening   When asked if exercising, patient responded: yesHis level of intensity when exercising is moderate.    Patient participates in the following activities: running and walking and gym + treadmill    Patient has increased his time on the treadmill by 10 minutes. Encouraged increasing daily activity.      Assigning the following patient goal(s): increase physical activity

## 2020-02-11 ENCOUNTER — PATIENT OUTREACH (OUTPATIENT)
Dept: OTHER | Facility: OTHER | Age: 40
End: 2020-02-11

## 2020-02-11 DIAGNOSIS — I10 ESSENTIAL HYPERTENSION: ICD-10-CM

## 2020-02-11 NOTE — PROGRESS NOTES
Digital Medicine: Clinician Follow-Up    Patient returned missed phone call for hypertension follow-up. Mr. Miller reports that things are fine. He has been compliant to his blood pressure medications. He recently made changes to his diet to see if that would help his blood pressure improve. To date, he is not seeing any impact or change. He is currently doing cardio workout 2-3 times a week for better outcomes as well. Patient is in agreement to increase irbesartan for better blood pressure control.     The history is provided by the patient. No  was used.     Follow Up  Follow-up reason(s): reading review and medication change          INTERVENTION(S)  recommended med change and encouragement/support    PLAN  patient verbalizes understanding, patient amenable to changes and continue monitoring    Current 30-day BP avg of 152/97 is uncontrolled, does not meet BP goal of <130/80.  Reading review: SBP/DBP is consistently elevated above goal.    Increase irbesartan to 300 mg daily.   Patient has 90-day supply from initial fill. He will increase dose from excess of this order before Rx is adjusted at pharmacy.   Continue verapamil as prescribed.     Follow-up in 2 weeks.      There are no preventive care reminders to display for this patient.    Last 5 Patient Entered Readings                                      Current 30 Day Average: 152/97     Recent Readings 2/7/2020 2/7/2020 2/6/2020 2/3/2020 2/3/2020    SBP (mmHg) 153 140 158 147 147    DBP (mmHg) 96 99 95 96 101    Pulse 95 96 77 73 71             Hypertension Medications             irbesartan (AVAPRO) 150 MG tablet Take 1 tablet (150 mg total) by mouth every evening.  2/11/20: increased to 300 mg QD for better BP control      verapamil (CALAN-SR) 180 MG CR tablet TAKE 1 TABLET BY MOUTH EVERY DAY                 Screenings

## 2020-02-14 DIAGNOSIS — F40.10 SOCIAL PHOBIA: ICD-10-CM

## 2020-02-14 DIAGNOSIS — F41.1 GAD (GENERALIZED ANXIETY DISORDER): ICD-10-CM

## 2020-02-14 RX ORDER — LORAZEPAM 0.5 MG/1
TABLET ORAL
Qty: 30 TABLET | Refills: 1 | Status: SHIPPED | OUTPATIENT
Start: 2020-02-14 | End: 2022-06-09 | Stop reason: SDUPTHER

## 2020-03-02 ENCOUNTER — PATIENT OUTREACH (OUTPATIENT)
Dept: OTHER | Facility: OTHER | Age: 40
End: 2020-03-02

## 2020-03-25 NOTE — PROGRESS NOTES
"Digital Medicine: Health  Follow-Up    Patient is doing good at this time and does not have any questions concerning BP readings, medication or lifestyle routine.    The history is provided by the patient.     Follow Up  Follow-up reason(s): reading review      Readings are trending down   Routine Education Topics: weight management and eating patterns        INTERVENTION(S)  recommended diet modifications, encouragement/support, goal setting and denied questions    PLAN  patient verbalizes understanding and continue monitoring    Patient plan  -Continue intermittent fasting  -Reduce sodium content in meals    There are no preventive care reminders to display for this patient.    Last 5 Patient Entered Readings                                      Current 30 Day Average: 143/92     Recent Readings 3/20/2020 3/12/2020 3/12/2020 3/8/2020 3/7/2020    SBP (mmHg) 139 142 151 143 146    DBP (mmHg) 85 93 100 88 92    Pulse 74 55 58 74 67                      Diet Screening   Patient reports eating or drinking the following: fresh vegetablesHe has the following dietary restrictions: low sodium dietHe skips meals regularly.  intermittent fasting  He has standard fasting periods.    Fasting details: intermittent fasting    Patient has lost 15lbs doing intermittent fasting the past month. He has been having 1 meal/day and has stopped drinking smoothies. Patient's meals usually consist of chicken/pork, brown rice, salads, etc. However, he admits he's had some BBQ sandwiches recently and attributes higher DBP to this. Discussed the importance of having a "good" meal when fasting and he agrees. Discussed higher sodium content in BBQ and bread and he now confirms understanding. Discussed various ways to reduce sodium like portion control, cutting the sandwich in half, open-faced sandwiches, etc and patient plans to "work on it".    Intervention(s): portion control, low sodium diet education and reducing sodium " intake    Assigning the following patient goals: maintain low sodium diet    Physical Activity Screening   No change to exercise routine.

## 2020-04-29 ENCOUNTER — PATIENT OUTREACH (OUTPATIENT)
Dept: OTHER | Facility: OTHER | Age: 40
End: 2020-04-29

## 2020-05-09 DIAGNOSIS — I10 ESSENTIAL HYPERTENSION: ICD-10-CM

## 2020-05-11 RX ORDER — IRBESARTAN 150 MG/1
TABLET ORAL
Qty: 90 TABLET | Refills: 1 | Status: SHIPPED | OUTPATIENT
Start: 2020-05-11 | End: 2020-11-16

## 2020-05-26 ENCOUNTER — PATIENT MESSAGE (OUTPATIENT)
Dept: ADMINISTRATIVE | Facility: OTHER | Age: 40
End: 2020-05-26

## 2020-05-26 ENCOUNTER — PATIENT OUTREACH (OUTPATIENT)
Dept: OTHER | Facility: OTHER | Age: 40
End: 2020-05-26

## 2020-05-26 NOTE — PROGRESS NOTES
Digital Medicine: Clinician Follow-Up    Called patient for hypertension follow-up. Patient sent message stating he is not able to refill his irbesartan at his pharmacy.       The history is provided by the patient. No  was used.     Follow Up  Follow-up reason(s): reading review    Mr. Miller confirms that he has not been able to refill his irbesartan over the past 2 weeks at his pharmacy. He says he has been without irbesartan for the past 2 weeks. He states that he is still taking verapamil as prescribed and was recently filled on 5/12/20. He confirmed no readings in the past 1.5 months due to moving. He will unpack device and resume measuring.       INTERVENTION(S)  encouragement/support    PLAN  patient verbalizes understanding and continue monitoring    No readings since 4/17/2020.  Explained to patient that regular BP measures are required for participation in the program and clinician Rx support. Patient expressed understanding.   Called [facility] and confirmed Rx order with refills was present. Staff was not looking for electronic Rx in the right location. Patient has Rx with 2 refills available. Activated refill on patient's behalf. Sent patient message to notify him medication is being refilled.     Continue current regimen.  Resume measuring BP for program monitoring.     Follow-up in 4 weeks.       There are no preventive care reminders to display for this patient.    Last 5 Patient Entered Readings                                      Current 30 Day Average:      Recent Readings 4/7/2020 4/7/2020 3/20/2020 3/12/2020 3/12/2020    SBP (mmHg) 133 157 139 142 151    DBP (mmHg) 93 99 85 93 100    Pulse 64 63 74 55 58             Hypertension Medications             irbesartan (AVAPRO) 150 MG tablet TAKE 1 TABLET BY MOUTH EVERY EVENING    verapamil (CALAN-SR) 180 MG CR tablet TAKE 1 TABLET BY MOUTH EVERY DAY                 Screenings

## 2020-06-15 ENCOUNTER — LAB VISIT (OUTPATIENT)
Dept: LAB | Facility: HOSPITAL | Age: 40
End: 2020-06-15
Attending: INTERNAL MEDICINE
Payer: COMMERCIAL

## 2020-06-15 DIAGNOSIS — I10 ESSENTIAL HYPERTENSION: ICD-10-CM

## 2020-06-15 LAB
ALBUMIN SERPL BCP-MCNC: 3.9 G/DL (ref 3.5–5.2)
ALP SERPL-CCNC: 57 U/L (ref 55–135)
ALT SERPL W/O P-5'-P-CCNC: 32 U/L (ref 10–44)
ANION GAP SERPL CALC-SCNC: 9 MMOL/L (ref 8–16)
AST SERPL-CCNC: 23 U/L (ref 10–40)
BILIRUB SERPL-MCNC: 0.3 MG/DL (ref 0.1–1)
BUN SERPL-MCNC: 24 MG/DL (ref 6–20)
CALCIUM SERPL-MCNC: 9 MG/DL (ref 8.7–10.5)
CHLORIDE SERPL-SCNC: 108 MMOL/L (ref 95–110)
CHOLEST SERPL-MCNC: 165 MG/DL (ref 120–199)
CHOLEST/HDLC SERPL: 4.1 {RATIO} (ref 2–5)
CO2 SERPL-SCNC: 24 MMOL/L (ref 23–29)
CREAT SERPL-MCNC: 1 MG/DL (ref 0.5–1.4)
EST. GFR  (AFRICAN AMERICAN): >60 ML/MIN/1.73 M^2
EST. GFR  (NON AFRICAN AMERICAN): >60 ML/MIN/1.73 M^2
GLUCOSE SERPL-MCNC: 92 MG/DL (ref 70–110)
HDLC SERPL-MCNC: 40 MG/DL (ref 40–75)
HDLC SERPL: 24.2 % (ref 20–50)
LDLC SERPL CALC-MCNC: 105 MG/DL (ref 63–159)
NONHDLC SERPL-MCNC: 125 MG/DL
POTASSIUM SERPL-SCNC: 3.8 MMOL/L (ref 3.5–5.1)
PROT SERPL-MCNC: 7 G/DL (ref 6–8.4)
SODIUM SERPL-SCNC: 141 MMOL/L (ref 136–145)
TRIGL SERPL-MCNC: 100 MG/DL (ref 30–150)

## 2020-06-15 PROCEDURE — 36415 COLL VENOUS BLD VENIPUNCTURE: CPT | Mod: PO

## 2020-06-15 PROCEDURE — 80061 LIPID PANEL: CPT

## 2020-06-15 PROCEDURE — 80053 COMPREHEN METABOLIC PANEL: CPT

## 2020-07-13 ENCOUNTER — OFFICE VISIT (OUTPATIENT)
Dept: INTERNAL MEDICINE | Facility: CLINIC | Age: 40
End: 2020-07-13
Payer: COMMERCIAL

## 2020-07-13 VITALS — SYSTOLIC BLOOD PRESSURE: 130 MMHG | DIASTOLIC BLOOD PRESSURE: 90 MMHG | HEART RATE: 79 BPM

## 2020-07-13 DIAGNOSIS — Z00.00 PREVENTATIVE HEALTH CARE: ICD-10-CM

## 2020-07-13 DIAGNOSIS — I10 ESSENTIAL HYPERTENSION: ICD-10-CM

## 2020-07-13 DIAGNOSIS — F41.1 GAD (GENERALIZED ANXIETY DISORDER): Primary | ICD-10-CM

## 2020-07-13 PROCEDURE — 3080F DIAST BP >= 90 MM HG: CPT | Mod: CPTII,,, | Performed by: INTERNAL MEDICINE

## 2020-07-13 PROCEDURE — 3075F PR MOST RECENT SYSTOLIC BLOOD PRESS GE 130-139MM HG: ICD-10-PCS | Mod: CPTII,,, | Performed by: INTERNAL MEDICINE

## 2020-07-13 PROCEDURE — 99214 OFFICE O/P EST MOD 30 MIN: CPT | Mod: 95,,, | Performed by: INTERNAL MEDICINE

## 2020-07-13 PROCEDURE — 3080F PR MOST RECENT DIASTOLIC BLOOD PRESSURE >= 90 MM HG: ICD-10-PCS | Mod: CPTII,,, | Performed by: INTERNAL MEDICINE

## 2020-07-13 PROCEDURE — 3075F SYST BP GE 130 - 139MM HG: CPT | Mod: CPTII,,, | Performed by: INTERNAL MEDICINE

## 2020-07-13 PROCEDURE — 99214 PR OFFICE/OUTPT VISIT, EST, LEVL IV, 30-39 MIN: ICD-10-PCS | Mod: 95,,, | Performed by: INTERNAL MEDICINE

## 2020-07-13 RX ORDER — VERAPAMIL HYDROCHLORIDE 240 MG/1
240 TABLET, FILM COATED, EXTENDED RELEASE ORAL DAILY
Qty: 90 TABLET | Refills: 3 | Status: SHIPPED | OUTPATIENT
Start: 2020-07-13 | End: 2021-07-07

## 2020-07-13 RX ORDER — ESCITALOPRAM OXALATE 20 MG/1
20 TABLET ORAL DAILY
Qty: 90 TABLET | Refills: 1 | Status: SHIPPED | OUTPATIENT
Start: 2020-07-13 | End: 2021-01-06

## 2020-07-13 NOTE — PROGRESS NOTES
Subjective:       Patient ID: Paul Espinoza is a 40 y.o. male.    Chief Complaint:  Hypertension anxiety    HPI 40-year-old male presents to clinic today virtual video visit for follow-up hypertension and generalized anxiety patient is doing well overall diastolic blood pressure is running a bit borderline elevated.  He is asymptomatic.  Anxiety is doing better especially since he has been working from home.  Review of Systems otherwise negative      Objective:      Physical Exam  General: Well-appearing, well-nourished.  No distress  HEENT: conjunctivae are normal. Hearing is grossly normal.  Nasopharynx april congestion  Oropharynx is clear.  Neck: Supple.  Visually No thyroid megaly.   Heart: Regular rate   Lungs:  respiratory effort normal.  Abdomen:  nontender  Extremities:   No edema.  Psych: Oriented to time person place.  Judgment and insight seem unimpaired.  Mood and affect are appropriate.  Assessment:       1. CHEMA (generalized anxiety disorder)    2. Essential hypertension    3. Preventative health care        Plan:       Diagnoses and all orders for this visit:    CHEMA (generalized anxiety disorder)  -     escitalopram oxalate (LEXAPRO) 20 MG tablet; Take 1 tablet (20 mg total) by mouth once daily.  Controlled.  Continue current medical regimen.  Prescription refills addressed.  Followup advised. See after visit summary.  Essential hypertension  -     verapamiL (CALAN-SR) 240 MG CR tablet; Take 1 tablet (240 mg total) by mouth once daily. TAKE 1 TABLET BY MOUTH EVERY DAY  Medication increased patient is to contact me in the next 1-2 weeks with blood pressure recordings.  Preventative health care  -     Comprehensive metabolic panel; Standing  -     CBC auto differential; Standing  -     TSH; Standing  -     PSA, Screening; Standing  -     Lipid Panel; Standing  Scheduled    The patient location is:  [facility]  The chief complaint leading to consultation is:  Hypertension generalized anxiety    Visit  type: audiovisual    Face to Face time with patient:  20  Twenty minutes of total time spent on the encounter, which includes face to face time and non-face to face time preparing to see the patient (eg, review of tests), Obtaining and/or reviewing separately obtained history, Documenting clinical information in the electronic or other health record, Independently interpreting results (not separately reported) and communicating results to the patient/family/caregiver, or Care coordination (not separately reported).         Each patient to whom he or she provides medical services by telemedicine is:  (1) informed of the relationship between the physician and patient and the respective role of any other health care provider with respect to management of the patient; and (2) notified that he or she may decline to receive medical services by telemedicine and may withdraw from such care at any time.    Notes:

## 2020-07-24 ENCOUNTER — PATIENT OUTREACH (OUTPATIENT)
Dept: OTHER | Facility: OTHER | Age: 40
End: 2020-07-24

## 2020-08-04 ENCOUNTER — PATIENT MESSAGE (OUTPATIENT)
Dept: ADMINISTRATIVE | Facility: OTHER | Age: 40
End: 2020-08-04

## 2020-08-10 NOTE — PROGRESS NOTES
"8/10/20 - On 8/4 patient sent the following message, "Hi Dr Cruz - I sent the message below to Dr Martinez. About 2 weeks ago, she increased my Verapamil dosage. Hi Dr Martinez- My blood pressure (lower number) seems to be holding in the low 90s. I'll continue to check it and give you updates. Thanks."     Reviewed patient chart to note that PCP Juan increased verapimil on 7/13/20. Patient has not been measuring BP regularly for monitoring by [facility]. Thanked patient for information, encouraged him to measure BP regularly for monitoring by [facility].     BP avg still not meeting goal. Consider increasing irbesartan to max dose. Consistently elevated DBP - high sodium consumption?    Last 5 Patient Entered Readings                                      Current 30 Day Average: 144/99     Recent Readings 8/4/2020 8/4/2020 8/2/2020 8/2/2020 7/30/2020    SBP (mmHg) 139 147 142 155 146    DBP (mmHg) 94 103 97 102 92    Pulse 66 62 65 66 66        Hypertension Medications             irbesartan (AVAPRO) 150 MG tablet TAKE 1 TABLET BY MOUTH EVERY EVENING    verapamiL (CALAN-SR) 240 MG CR tablet Take 1 tablet (240 mg total) by mouth once daily. TAKE 1 TABLET BY MOUTH EVERY DAY                 "

## 2020-11-09 ENCOUNTER — PATIENT OUTREACH (OUTPATIENT)
Dept: OTHER | Facility: OTHER | Age: 40
End: 2020-11-09

## 2020-11-09 DIAGNOSIS — I10 ESSENTIAL HYPERTENSION: Primary | ICD-10-CM

## 2020-11-12 ENCOUNTER — PATIENT OUTREACH (OUTPATIENT)
Dept: OTHER | Facility: OTHER | Age: 40
End: 2020-11-12

## 2021-01-05 ENCOUNTER — PATIENT MESSAGE (OUTPATIENT)
Dept: ADMINISTRATIVE | Facility: OTHER | Age: 41
End: 2021-01-05

## 2021-01-08 ENCOUNTER — LAB VISIT (OUTPATIENT)
Dept: LAB | Facility: HOSPITAL | Age: 41
End: 2021-01-08
Attending: INTERNAL MEDICINE
Payer: COMMERCIAL

## 2021-01-08 DIAGNOSIS — Z00.00 PREVENTATIVE HEALTH CARE: ICD-10-CM

## 2021-01-08 LAB
ALBUMIN SERPL BCP-MCNC: 4.3 G/DL (ref 3.5–5.2)
ALP SERPL-CCNC: 58 U/L (ref 38–126)
ALT SERPL W/O P-5'-P-CCNC: 62 U/L (ref 10–44)
ANION GAP SERPL CALC-SCNC: 4 MMOL/L (ref 8–16)
AST SERPL-CCNC: 37 U/L (ref 15–46)
BASOPHILS # BLD AUTO: 0.03 K/UL (ref 0–0.2)
BASOPHILS NFR BLD: 0.7 % (ref 0–1.9)
BILIRUB SERPL-MCNC: 0.4 MG/DL (ref 0.1–1)
CALCIUM SERPL-MCNC: 9.5 MG/DL (ref 8.7–10.5)
CHLORIDE SERPL-SCNC: 104 MMOL/L (ref 95–110)
CHOLEST SERPL-MCNC: 183 MG/DL (ref 120–199)
CHOLEST/HDLC SERPL: 4.7 {RATIO} (ref 2–5)
CO2 SERPL-SCNC: 35 MMOL/L (ref 23–29)
COMPLEXED PSA SERPL-MCNC: 0.64 NG/ML (ref 0–4)
CREAT SERPL-MCNC: 0.99 MG/DL (ref 0.5–1.4)
DIFFERENTIAL METHOD: ABNORMAL
EOSINOPHIL # BLD AUTO: 0.3 K/UL (ref 0–0.5)
EOSINOPHIL NFR BLD: 6.7 % (ref 0–8)
ERYTHROCYTE [DISTWIDTH] IN BLOOD BY AUTOMATED COUNT: 12.5 % (ref 11.5–14.5)
EST. GFR  (AFRICAN AMERICAN): >60 ML/MIN/1.73 M^2
EST. GFR  (NON AFRICAN AMERICAN): >60 ML/MIN/1.73 M^2
GLUCOSE SERPL-MCNC: 106 MG/DL (ref 70–110)
HCT VFR BLD AUTO: 43.9 % (ref 40–54)
HDLC SERPL-MCNC: 39 MG/DL (ref 40–75)
HDLC SERPL: 21.3 % (ref 20–50)
HGB BLD-MCNC: 14.5 G/DL (ref 14–18)
IMM GRANULOCYTES # BLD AUTO: 0.02 K/UL (ref 0–0.04)
IMM GRANULOCYTES NFR BLD AUTO: 0.5 % (ref 0–0.5)
LDLC SERPL CALC-MCNC: 124.8 MG/DL (ref 63–159)
LYMPHOCYTES # BLD AUTO: 2.1 K/UL (ref 1–4.8)
LYMPHOCYTES NFR BLD: 51.1 % (ref 18–48)
MCH RBC QN AUTO: 28.3 PG (ref 27–31)
MCHC RBC AUTO-ENTMCNC: 33 G/DL (ref 32–36)
MCV RBC AUTO: 86 FL (ref 82–98)
MONOCYTES # BLD AUTO: 0.3 K/UL (ref 0.3–1)
MONOCYTES NFR BLD: 8.2 % (ref 4–15)
NEUTROPHILS # BLD AUTO: 1.4 K/UL (ref 1.8–7.7)
NEUTROPHILS NFR BLD: 32.8 % (ref 38–73)
NONHDLC SERPL-MCNC: 144 MG/DL
NRBC BLD-RTO: 0 /100 WBC
PLATELET # BLD AUTO: 246 K/UL (ref 150–350)
PMV BLD AUTO: 9.6 FL (ref 9.2–12.9)
POTASSIUM SERPL-SCNC: 4.4 MMOL/L (ref 3.5–5.1)
PROT SERPL-MCNC: 7.6 G/DL (ref 6–8.4)
RBC # BLD AUTO: 5.12 M/UL (ref 4.6–6.2)
SODIUM SERPL-SCNC: 143 MMOL/L (ref 136–145)
TRIGL SERPL-MCNC: 96 MG/DL (ref 30–150)
TSH SERPL DL<=0.005 MIU/L-ACNC: 2.6 UIU/ML (ref 0.4–4)
UUN UR-MCNC: 16 MG/DL (ref 2–20)
WBC # BLD AUTO: 4.15 K/UL (ref 3.9–12.7)

## 2021-01-08 PROCEDURE — 85025 COMPLETE CBC W/AUTO DIFF WBC: CPT | Mod: PO

## 2021-01-08 PROCEDURE — 36415 COLL VENOUS BLD VENIPUNCTURE: CPT | Mod: PO

## 2021-01-08 PROCEDURE — 80061 LIPID PANEL: CPT

## 2021-01-08 PROCEDURE — 80053 COMPREHEN METABOLIC PANEL: CPT | Mod: PO

## 2021-01-08 PROCEDURE — 84153 ASSAY OF PSA TOTAL: CPT

## 2021-01-08 PROCEDURE — 84443 ASSAY THYROID STIM HORMONE: CPT | Mod: PO

## 2021-01-11 ENCOUNTER — OFFICE VISIT (OUTPATIENT)
Dept: INTERNAL MEDICINE | Facility: CLINIC | Age: 41
End: 2021-01-11
Payer: COMMERCIAL

## 2021-01-11 VITALS
TEMPERATURE: 97 F | OXYGEN SATURATION: 97 % | WEIGHT: 280.88 LBS | HEIGHT: 72 IN | SYSTOLIC BLOOD PRESSURE: 136 MMHG | HEART RATE: 60 BPM | RESPIRATION RATE: 16 BRPM | DIASTOLIC BLOOD PRESSURE: 102 MMHG | BODY MASS INDEX: 38.04 KG/M2

## 2021-01-11 DIAGNOSIS — E66.9 OBESITY, CLASS II, BMI 35-39.9, ISOLATED (SEE ACTUAL BMI): ICD-10-CM

## 2021-01-11 DIAGNOSIS — I10 ESSENTIAL HYPERTENSION: ICD-10-CM

## 2021-01-11 DIAGNOSIS — Z00.00 PREVENTATIVE HEALTH CARE: Primary | ICD-10-CM

## 2021-01-11 PROCEDURE — 99999 PR PBB SHADOW E&M-EST. PATIENT-LVL IV: CPT | Mod: PBBFAC,,, | Performed by: INTERNAL MEDICINE

## 2021-01-11 PROCEDURE — 99999 PR PBB SHADOW E&M-EST. PATIENT-LVL IV: ICD-10-PCS | Mod: PBBFAC,,, | Performed by: INTERNAL MEDICINE

## 2021-01-11 PROCEDURE — 1126F AMNT PAIN NOTED NONE PRSNT: CPT | Mod: S$GLB,,, | Performed by: INTERNAL MEDICINE

## 2021-01-11 PROCEDURE — 3075F PR MOST RECENT SYSTOLIC BLOOD PRESS GE 130-139MM HG: ICD-10-PCS | Mod: CPTII,S$GLB,, | Performed by: INTERNAL MEDICINE

## 2021-01-11 PROCEDURE — 99396 PR PREVENTIVE VISIT,EST,40-64: ICD-10-PCS | Mod: S$GLB,,, | Performed by: INTERNAL MEDICINE

## 2021-01-11 PROCEDURE — 3080F PR MOST RECENT DIASTOLIC BLOOD PRESSURE >= 90 MM HG: ICD-10-PCS | Mod: CPTII,S$GLB,, | Performed by: INTERNAL MEDICINE

## 2021-01-11 PROCEDURE — 3075F SYST BP GE 130 - 139MM HG: CPT | Mod: CPTII,S$GLB,, | Performed by: INTERNAL MEDICINE

## 2021-01-11 PROCEDURE — 99396 PREV VISIT EST AGE 40-64: CPT | Mod: S$GLB,,, | Performed by: INTERNAL MEDICINE

## 2021-01-11 PROCEDURE — 1126F PR PAIN SEVERITY QUANTIFIED, NO PAIN PRESENT: ICD-10-PCS | Mod: S$GLB,,, | Performed by: INTERNAL MEDICINE

## 2021-01-11 PROCEDURE — 3008F BODY MASS INDEX DOCD: CPT | Mod: CPTII,S$GLB,, | Performed by: INTERNAL MEDICINE

## 2021-01-11 PROCEDURE — 3008F PR BODY MASS INDEX (BMI) DOCUMENTED: ICD-10-PCS | Mod: CPTII,S$GLB,, | Performed by: INTERNAL MEDICINE

## 2021-01-11 PROCEDURE — 3080F DIAST BP >= 90 MM HG: CPT | Mod: CPTII,S$GLB,, | Performed by: INTERNAL MEDICINE

## 2021-01-11 RX ORDER — IRBESARTAN 300 MG/1
300 TABLET ORAL NIGHTLY
Qty: 90 TABLET | Refills: 1 | Status: SHIPPED | OUTPATIENT
Start: 2021-01-11 | End: 2021-07-07

## 2021-01-11 RX ORDER — INFLUENZA A VIRUS A/VICTORIA/2454/2019 IVR-207 (H1N1) ANTIGEN (PROPIOLACTONE INACTIVATED), INFLUENZA A VIRUS A/HONG KONG/2671/2019 IVR-208 (H3N2) ANTIGEN (PROPIOLACTONE INACTIVATED), INFLUENZA B VIRUS B/VICTORIA/705/2018 BVR-11 ANTIGEN (PROPIOLACTONE INACTIVATED), INFLUENZA B VIRUS B/PHUKET/3073/2013 BVR-1B ANTIGEN (PROPIOLACTONE INACTIVATED) 15; 15; 15; 15 UG/.5ML; UG/.5ML; UG/.5ML; UG/.5ML
INJECTION, SUSPENSION INTRAMUSCULAR
COMMUNITY
Start: 2020-10-19 | End: 2023-08-21

## 2021-02-01 ENCOUNTER — PATIENT MESSAGE (OUTPATIENT)
Dept: INTERNAL MEDICINE | Facility: CLINIC | Age: 41
End: 2021-02-01

## 2021-02-01 DIAGNOSIS — I10 ESSENTIAL HYPERTENSION: Primary | ICD-10-CM

## 2021-02-01 RX ORDER — TRIAMTERENE/HYDROCHLOROTHIAZID 37.5-25 MG
1 TABLET ORAL DAILY
Qty: 90 TABLET | Refills: 1 | Status: SHIPPED | OUTPATIENT
Start: 2021-02-01 | End: 2021-07-27 | Stop reason: SDUPTHER

## 2021-04-29 ENCOUNTER — PATIENT MESSAGE (OUTPATIENT)
Dept: INTERNAL MEDICINE | Facility: CLINIC | Age: 41
End: 2021-04-29

## 2021-05-05 RX ORDER — IRBESARTAN 150 MG/1
TABLET ORAL
COMMUNITY
Start: 2021-02-13 | End: 2021-05-06

## 2021-05-07 ENCOUNTER — OFFICE VISIT (OUTPATIENT)
Dept: INTERNAL MEDICINE | Facility: CLINIC | Age: 41
End: 2021-05-07
Payer: COMMERCIAL

## 2021-05-07 VITALS
SYSTOLIC BLOOD PRESSURE: 122 MMHG | DIASTOLIC BLOOD PRESSURE: 84 MMHG | HEART RATE: 69 BPM | OXYGEN SATURATION: 97 % | WEIGHT: 282.44 LBS | BODY MASS INDEX: 38.3 KG/M2

## 2021-05-07 DIAGNOSIS — I10 ESSENTIAL HYPERTENSION: ICD-10-CM

## 2021-05-07 DIAGNOSIS — M54.9 DORSALGIA, UNSPECIFIED: ICD-10-CM

## 2021-05-07 DIAGNOSIS — S39.012A LOW BACK STRAIN, INITIAL ENCOUNTER: ICD-10-CM

## 2021-05-07 DIAGNOSIS — M62.838 MUSCLE SPASM: Primary | ICD-10-CM

## 2021-05-07 PROCEDURE — 99999 PR PBB SHADOW E&M-EST. PATIENT-LVL IV: ICD-10-PCS | Mod: PBBFAC,,, | Performed by: INTERNAL MEDICINE

## 2021-05-07 PROCEDURE — 1125F AMNT PAIN NOTED PAIN PRSNT: CPT | Mod: S$GLB,,, | Performed by: INTERNAL MEDICINE

## 2021-05-07 PROCEDURE — 99214 OFFICE O/P EST MOD 30 MIN: CPT | Mod: S$GLB,,, | Performed by: INTERNAL MEDICINE

## 2021-05-07 PROCEDURE — 3008F PR BODY MASS INDEX (BMI) DOCUMENTED: ICD-10-PCS | Mod: CPTII,S$GLB,, | Performed by: INTERNAL MEDICINE

## 2021-05-07 PROCEDURE — 99214 PR OFFICE/OUTPT VISIT, EST, LEVL IV, 30-39 MIN: ICD-10-PCS | Mod: S$GLB,,, | Performed by: INTERNAL MEDICINE

## 2021-05-07 PROCEDURE — 3008F BODY MASS INDEX DOCD: CPT | Mod: CPTII,S$GLB,, | Performed by: INTERNAL MEDICINE

## 2021-05-07 PROCEDURE — 99999 PR PBB SHADOW E&M-EST. PATIENT-LVL IV: CPT | Mod: PBBFAC,,, | Performed by: INTERNAL MEDICINE

## 2021-05-07 PROCEDURE — 1125F PR PAIN SEVERITY QUANTIFIED, PAIN PRESENT: ICD-10-PCS | Mod: S$GLB,,, | Performed by: INTERNAL MEDICINE

## 2021-05-07 RX ORDER — CYCLOBENZAPRINE HCL 10 MG
10 TABLET ORAL 2 TIMES DAILY PRN
Qty: 60 TABLET | Refills: 3 | Status: SHIPPED | OUTPATIENT
Start: 2021-05-07 | End: 2023-08-21

## 2021-05-15 ENCOUNTER — HOSPITAL ENCOUNTER (OUTPATIENT)
Dept: RADIOLOGY | Facility: HOSPITAL | Age: 41
Discharge: HOME | End: 2021-05-15
Attending: INTERNAL MEDICINE
Payer: COMMERCIAL

## 2021-05-15 DIAGNOSIS — M54.9 DORSALGIA, UNSPECIFIED: ICD-10-CM

## 2021-05-15 PROCEDURE — 72148 MRI LUMBAR SPINE W/O DYE: CPT | Mod: TC

## 2021-05-15 PROCEDURE — 72148 MRI LUMBAR SPINE WITHOUT CONTRAST: ICD-10-PCS | Mod: 26,,, | Performed by: RADIOLOGY

## 2021-05-15 PROCEDURE — 72148 MRI LUMBAR SPINE W/O DYE: CPT | Mod: 26,,, | Performed by: RADIOLOGY

## 2021-05-17 ENCOUNTER — PATIENT MESSAGE (OUTPATIENT)
Dept: INTERNAL MEDICINE | Facility: CLINIC | Age: 41
End: 2021-05-17

## 2021-05-17 DIAGNOSIS — M54.16 LUMBAR RADICULOPATHY: Primary | ICD-10-CM

## 2021-05-17 DIAGNOSIS — M47.26 OSTEOARTHRITIS OF SPINE WITH RADICULOPATHY, LUMBAR REGION: ICD-10-CM

## 2021-06-03 ENCOUNTER — OFFICE VISIT (OUTPATIENT)
Dept: PAIN MEDICINE | Facility: CLINIC | Age: 41
End: 2021-06-03
Payer: COMMERCIAL

## 2021-06-03 VITALS — DIASTOLIC BLOOD PRESSURE: 78 MMHG | HEART RATE: 76 BPM | SYSTOLIC BLOOD PRESSURE: 120 MMHG

## 2021-06-03 DIAGNOSIS — M51.36 DDD (DEGENERATIVE DISC DISEASE), LUMBAR: ICD-10-CM

## 2021-06-03 DIAGNOSIS — G89.29 CHRONIC BILATERAL LOW BACK PAIN WITHOUT SCIATICA: ICD-10-CM

## 2021-06-03 DIAGNOSIS — M54.50 CHRONIC BILATERAL LOW BACK PAIN WITHOUT SCIATICA: ICD-10-CM

## 2021-06-03 DIAGNOSIS — M47.816 LUMBAR SPONDYLOSIS: ICD-10-CM

## 2021-06-03 DIAGNOSIS — M53.3 SACROILIAC JOINT PAIN: ICD-10-CM

## 2021-06-03 DIAGNOSIS — M51.26 HNP (HERNIATED NUCLEUS PULPOSUS), LUMBAR: ICD-10-CM

## 2021-06-03 DIAGNOSIS — E66.9 OBESITY, CLASS II, BMI 35-39.9, ISOLATED (SEE ACTUAL BMI): ICD-10-CM

## 2021-06-03 DIAGNOSIS — M79.18 MYOFASCIAL PAIN: Primary | ICD-10-CM

## 2021-06-03 PROCEDURE — 99244 OFF/OP CNSLTJ NEW/EST MOD 40: CPT | Mod: 25,S$GLB,, | Performed by: PHYSICAL MEDICINE & REHABILITATION

## 2021-06-03 PROCEDURE — 1125F PR PAIN SEVERITY QUANTIFIED, PAIN PRESENT: ICD-10-PCS | Mod: S$GLB,,, | Performed by: PHYSICAL MEDICINE & REHABILITATION

## 2021-06-03 PROCEDURE — 20553 PR INJECT TRIGGER POINTS, > 3: ICD-10-PCS | Mod: S$GLB,,, | Performed by: PHYSICAL MEDICINE & REHABILITATION

## 2021-06-03 PROCEDURE — 99244 PR OFFICE CONSULTATION,LEVEL IV: ICD-10-PCS | Mod: 25,S$GLB,, | Performed by: PHYSICAL MEDICINE & REHABILITATION

## 2021-06-03 PROCEDURE — 1125F AMNT PAIN NOTED PAIN PRSNT: CPT | Mod: S$GLB,,, | Performed by: PHYSICAL MEDICINE & REHABILITATION

## 2021-06-03 PROCEDURE — 99999 PR PBB SHADOW E&M-EST. PATIENT-LVL IV: ICD-10-PCS | Mod: PBBFAC,,, | Performed by: PHYSICAL MEDICINE & REHABILITATION

## 2021-06-03 PROCEDURE — 20553 NJX 1/MLT TRIGGER POINTS 3/>: CPT | Mod: S$GLB,,, | Performed by: PHYSICAL MEDICINE & REHABILITATION

## 2021-06-03 PROCEDURE — 99999 PR PBB SHADOW E&M-EST. PATIENT-LVL IV: CPT | Mod: PBBFAC,,, | Performed by: PHYSICAL MEDICINE & REHABILITATION

## 2021-06-03 RX ORDER — NAPROXEN 500 MG/1
500 TABLET ORAL 2 TIMES DAILY WITH MEALS
Qty: 60 TABLET | Refills: 0 | Status: SHIPPED | OUTPATIENT
Start: 2021-06-03 | End: 2021-07-09

## 2021-06-30 ENCOUNTER — CLINICAL SUPPORT (OUTPATIENT)
Dept: REHABILITATION | Facility: HOSPITAL | Age: 41
End: 2021-06-30
Attending: PHYSICAL MEDICINE & REHABILITATION
Payer: COMMERCIAL

## 2021-06-30 DIAGNOSIS — M79.18 MYOFASCIAL PAIN: ICD-10-CM

## 2021-06-30 DIAGNOSIS — M53.86 DECREASED ROM OF LUMBAR SPINE: ICD-10-CM

## 2021-06-30 DIAGNOSIS — M47.816 LUMBAR SPONDYLOSIS: ICD-10-CM

## 2021-06-30 DIAGNOSIS — R29.898 WEAKNESS OF HIP, UNSPECIFIED LATERALITY: ICD-10-CM

## 2021-06-30 DIAGNOSIS — M53.3 SACROILIAC JOINT PAIN: ICD-10-CM

## 2021-06-30 PROCEDURE — 97161 PT EVAL LOW COMPLEX 20 MIN: CPT | Mod: PO

## 2021-07-02 PROBLEM — M53.86 DECREASED ROM OF LUMBAR SPINE: Status: ACTIVE | Noted: 2021-07-02

## 2021-07-02 PROBLEM — R29.898 HIP WEAKNESS: Status: ACTIVE | Noted: 2021-07-02

## 2021-07-08 ENCOUNTER — OFFICE VISIT (OUTPATIENT)
Dept: PAIN MEDICINE | Facility: CLINIC | Age: 41
End: 2021-07-08
Payer: COMMERCIAL

## 2021-07-08 VITALS — HEART RATE: 62 BPM | SYSTOLIC BLOOD PRESSURE: 126 MMHG | DIASTOLIC BLOOD PRESSURE: 83 MMHG

## 2021-07-08 DIAGNOSIS — M47.816 LUMBAR SPONDYLOSIS: ICD-10-CM

## 2021-07-08 DIAGNOSIS — M53.3 SACROILIAC JOINT PAIN: ICD-10-CM

## 2021-07-08 DIAGNOSIS — E66.9 OBESITY, CLASS II, BMI 35-39.9, ISOLATED (SEE ACTUAL BMI): ICD-10-CM

## 2021-07-08 DIAGNOSIS — G89.29 CHRONIC BILATERAL LOW BACK PAIN WITHOUT SCIATICA: ICD-10-CM

## 2021-07-08 DIAGNOSIS — M79.18 MYOFASCIAL PAIN: ICD-10-CM

## 2021-07-08 DIAGNOSIS — M51.36 DDD (DEGENERATIVE DISC DISEASE), LUMBAR: ICD-10-CM

## 2021-07-08 DIAGNOSIS — M54.50 CHRONIC BILATERAL LOW BACK PAIN WITHOUT SCIATICA: ICD-10-CM

## 2021-07-08 DIAGNOSIS — M51.26 HNP (HERNIATED NUCLEUS PULPOSUS), LUMBAR: Primary | ICD-10-CM

## 2021-07-08 PROCEDURE — 1125F PR PAIN SEVERITY QUANTIFIED, PAIN PRESENT: ICD-10-PCS | Mod: S$GLB,,, | Performed by: NURSE PRACTITIONER

## 2021-07-08 PROCEDURE — 99213 PR OFFICE/OUTPT VISIT, EST, LEVL III, 20-29 MIN: ICD-10-PCS | Mod: S$GLB,,, | Performed by: NURSE PRACTITIONER

## 2021-07-08 PROCEDURE — 1125F AMNT PAIN NOTED PAIN PRSNT: CPT | Mod: S$GLB,,, | Performed by: NURSE PRACTITIONER

## 2021-07-08 PROCEDURE — 99213 OFFICE O/P EST LOW 20 MIN: CPT | Mod: S$GLB,,, | Performed by: NURSE PRACTITIONER

## 2021-07-08 PROCEDURE — 99999 PR PBB SHADOW E&M-EST. PATIENT-LVL III: ICD-10-PCS | Mod: PBBFAC,,, | Performed by: NURSE PRACTITIONER

## 2021-07-08 PROCEDURE — 99999 PR PBB SHADOW E&M-EST. PATIENT-LVL III: CPT | Mod: PBBFAC,,, | Performed by: NURSE PRACTITIONER

## 2021-07-21 ENCOUNTER — CLINICAL SUPPORT (OUTPATIENT)
Dept: REHABILITATION | Facility: HOSPITAL | Age: 41
End: 2021-07-21
Payer: COMMERCIAL

## 2021-07-21 DIAGNOSIS — R29.898 WEAKNESS OF HIP, UNSPECIFIED LATERALITY: ICD-10-CM

## 2021-07-21 DIAGNOSIS — M53.86 DECREASED ROM OF LUMBAR SPINE: ICD-10-CM

## 2021-07-21 PROCEDURE — 97110 THERAPEUTIC EXERCISES: CPT | Mod: PO

## 2021-07-28 ENCOUNTER — CLINICAL SUPPORT (OUTPATIENT)
Dept: REHABILITATION | Facility: HOSPITAL | Age: 41
End: 2021-07-28
Payer: COMMERCIAL

## 2021-07-28 DIAGNOSIS — R29.898 WEAKNESS OF HIP, UNSPECIFIED LATERALITY: ICD-10-CM

## 2021-07-28 DIAGNOSIS — M53.86 DECREASED ROM OF LUMBAR SPINE: ICD-10-CM

## 2021-07-28 PROCEDURE — 97110 THERAPEUTIC EXERCISES: CPT | Mod: PO

## 2021-08-04 ENCOUNTER — CLINICAL SUPPORT (OUTPATIENT)
Dept: REHABILITATION | Facility: HOSPITAL | Age: 41
End: 2021-08-04
Payer: COMMERCIAL

## 2021-08-04 DIAGNOSIS — M53.86 DECREASED ROM OF LUMBAR SPINE: ICD-10-CM

## 2021-08-04 DIAGNOSIS — R29.898 WEAKNESS OF HIP, UNSPECIFIED LATERALITY: ICD-10-CM

## 2021-08-04 PROCEDURE — 97110 THERAPEUTIC EXERCISES: CPT | Mod: PO,97

## 2021-08-05 ENCOUNTER — PATIENT OUTREACH (OUTPATIENT)
Dept: ADMINISTRATIVE | Facility: OTHER | Age: 41
End: 2021-08-05

## 2021-08-06 ENCOUNTER — OFFICE VISIT (OUTPATIENT)
Dept: PAIN MEDICINE | Facility: CLINIC | Age: 41
End: 2021-08-06
Payer: COMMERCIAL

## 2021-08-06 ENCOUNTER — TELEPHONE (OUTPATIENT)
Dept: PAIN MEDICINE | Facility: CLINIC | Age: 41
End: 2021-08-06

## 2021-08-06 DIAGNOSIS — M54.50 CHRONIC BILATERAL LOW BACK PAIN WITHOUT SCIATICA: Primary | ICD-10-CM

## 2021-08-06 DIAGNOSIS — M47.816 LUMBAR SPONDYLOSIS: Primary | ICD-10-CM

## 2021-08-06 DIAGNOSIS — M53.3 SACROILIAC JOINT PAIN: ICD-10-CM

## 2021-08-06 DIAGNOSIS — M51.36 DDD (DEGENERATIVE DISC DISEASE), LUMBAR: ICD-10-CM

## 2021-08-06 DIAGNOSIS — M51.26 HNP (HERNIATED NUCLEUS PULPOSUS), LUMBAR: ICD-10-CM

## 2021-08-06 DIAGNOSIS — M47.816 LUMBAR SPONDYLOSIS: ICD-10-CM

## 2021-08-06 DIAGNOSIS — M79.18 MYOFASCIAL PAIN: ICD-10-CM

## 2021-08-06 DIAGNOSIS — G89.29 CHRONIC BILATERAL LOW BACK PAIN WITHOUT SCIATICA: Primary | ICD-10-CM

## 2021-08-06 PROCEDURE — 99213 PR OFFICE/OUTPT VISIT, EST, LEVL III, 20-29 MIN: ICD-10-PCS | Mod: 95,,, | Performed by: NURSE PRACTITIONER

## 2021-08-06 PROCEDURE — 99213 OFFICE O/P EST LOW 20 MIN: CPT | Mod: 95,,, | Performed by: NURSE PRACTITIONER

## 2021-08-06 PROCEDURE — 1160F PR REVIEW ALL MEDS BY PRESCRIBER/CLIN PHARMACIST DOCUMENTED: ICD-10-PCS | Mod: CPTII,,, | Performed by: NURSE PRACTITIONER

## 2021-08-06 PROCEDURE — 1159F MED LIST DOCD IN RCRD: CPT | Mod: CPTII,,, | Performed by: NURSE PRACTITIONER

## 2021-08-06 PROCEDURE — 1160F RVW MEDS BY RX/DR IN RCRD: CPT | Mod: CPTII,,, | Performed by: NURSE PRACTITIONER

## 2021-08-06 PROCEDURE — 1159F PR MEDICATION LIST DOCUMENTED IN MEDICAL RECORD: ICD-10-PCS | Mod: CPTII,,, | Performed by: NURSE PRACTITIONER

## 2021-08-06 RX ORDER — NAPROXEN 500 MG/1
TABLET ORAL
Qty: 60 TABLET | Refills: 0 | Status: SHIPPED | OUTPATIENT
Start: 2021-08-06 | End: 2023-08-21

## 2021-08-24 ENCOUNTER — TELEPHONE (OUTPATIENT)
Dept: PAIN MEDICINE | Facility: CLINIC | Age: 41
End: 2021-08-24

## 2021-08-24 ENCOUNTER — PATIENT MESSAGE (OUTPATIENT)
Dept: PAIN MEDICINE | Facility: HOSPITAL | Age: 41
End: 2021-08-24

## 2021-08-25 ENCOUNTER — HOSPITAL ENCOUNTER (OUTPATIENT)
Facility: HOSPITAL | Age: 41
Discharge: HOME | End: 2021-08-25
Attending: PHYSICAL MEDICINE & REHABILITATION | Admitting: PHYSICAL MEDICINE & REHABILITATION
Payer: COMMERCIAL

## 2021-08-25 VITALS
OXYGEN SATURATION: 100 % | RESPIRATION RATE: 16 BRPM | DIASTOLIC BLOOD PRESSURE: 81 MMHG | HEART RATE: 62 BPM | SYSTOLIC BLOOD PRESSURE: 140 MMHG | TEMPERATURE: 98 F

## 2021-08-25 DIAGNOSIS — R52 PAIN: ICD-10-CM

## 2021-08-25 DIAGNOSIS — M47.816 LUMBAR SPONDYLOSIS: Primary | ICD-10-CM

## 2021-08-25 PROCEDURE — 64493 INJ PARAVERT F JNT L/S 1 LEV: CPT | Mod: 50 | Performed by: PHYSICAL MEDICINE & REHABILITATION

## 2021-08-25 PROCEDURE — 25500020 PHARM REV CODE 255: Performed by: PHYSICAL MEDICINE & REHABILITATION

## 2021-08-25 PROCEDURE — 64494 INJ PARAVERT F JNT L/S 2 LEV: CPT | Mod: 50 | Performed by: PHYSICAL MEDICINE & REHABILITATION

## 2021-08-25 PROCEDURE — 64494 PR INJ DX/THER AGNT PARAVERT FACET JOINT,IMG GUIDE,LUMBAR/SAC, 2ND LEVEL: ICD-10-PCS | Mod: 50,,, | Performed by: PHYSICAL MEDICINE & REHABILITATION

## 2021-08-25 PROCEDURE — 64493 INJ PARAVERT F JNT L/S 1 LEV: CPT | Mod: 50,,, | Performed by: PHYSICAL MEDICINE & REHABILITATION

## 2021-08-25 PROCEDURE — 64494 INJ PARAVERT F JNT L/S 2 LEV: CPT | Mod: 50,,, | Performed by: PHYSICAL MEDICINE & REHABILITATION

## 2021-08-25 PROCEDURE — 64493 PR INJ DX/THER AGNT PARAVERT FACET JOINT,IMG GUIDE,LUMBAR/SAC,1ST LVL: ICD-10-PCS | Mod: 50,,, | Performed by: PHYSICAL MEDICINE & REHABILITATION

## 2021-08-25 PROCEDURE — 25000003 PHARM REV CODE 250: Performed by: PHYSICAL MEDICINE & REHABILITATION

## 2021-08-25 RX ORDER — BUPIVACAINE HYDROCHLORIDE 2.5 MG/ML
INJECTION, SOLUTION EPIDURAL; INFILTRATION; INTRACAUDAL
Status: DISCONTINUED | OUTPATIENT
Start: 2021-08-25 | End: 2021-08-25 | Stop reason: HOSPADM

## 2021-08-25 RX ORDER — LIDOCAINE HYDROCHLORIDE 10 MG/ML
INJECTION INFILTRATION; PERINEURAL
Status: DISCONTINUED | OUTPATIENT
Start: 2021-08-25 | End: 2021-08-25 | Stop reason: HOSPADM

## 2021-08-26 ENCOUNTER — OFFICE VISIT (OUTPATIENT)
Dept: PAIN MEDICINE | Facility: CLINIC | Age: 41
End: 2021-08-26
Payer: COMMERCIAL

## 2021-08-26 ENCOUNTER — TELEPHONE (OUTPATIENT)
Dept: PAIN MEDICINE | Facility: CLINIC | Age: 41
End: 2021-08-26

## 2021-08-26 VITALS
HEART RATE: 69 BPM | SYSTOLIC BLOOD PRESSURE: 123 MMHG | DIASTOLIC BLOOD PRESSURE: 86 MMHG | BODY MASS INDEX: 38.3 KG/M2 | WEIGHT: 282.44 LBS

## 2021-08-26 DIAGNOSIS — M51.26 HNP (HERNIATED NUCLEUS PULPOSUS), LUMBAR: ICD-10-CM

## 2021-08-26 DIAGNOSIS — M51.36 DDD (DEGENERATIVE DISC DISEASE), LUMBAR: ICD-10-CM

## 2021-08-26 DIAGNOSIS — E66.9 OBESITY, CLASS II, BMI 35-39.9, ISOLATED (SEE ACTUAL BMI): ICD-10-CM

## 2021-08-26 DIAGNOSIS — M47.816 LUMBAR SPONDYLOSIS: Primary | ICD-10-CM

## 2021-08-26 DIAGNOSIS — M79.18 MYOFASCIAL PAIN: ICD-10-CM

## 2021-08-26 DIAGNOSIS — M54.50 CHRONIC BILATERAL LOW BACK PAIN WITHOUT SCIATICA: ICD-10-CM

## 2021-08-26 DIAGNOSIS — G89.29 CHRONIC BILATERAL LOW BACK PAIN WITHOUT SCIATICA: ICD-10-CM

## 2021-08-26 PROCEDURE — 3074F PR MOST RECENT SYSTOLIC BLOOD PRESSURE < 130 MM HG: ICD-10-PCS | Mod: CPTII,S$GLB,, | Performed by: NURSE PRACTITIONER

## 2021-08-26 PROCEDURE — 1159F MED LIST DOCD IN RCRD: CPT | Mod: CPTII,S$GLB,, | Performed by: NURSE PRACTITIONER

## 2021-08-26 PROCEDURE — 3008F BODY MASS INDEX DOCD: CPT | Mod: CPTII,S$GLB,, | Performed by: NURSE PRACTITIONER

## 2021-08-26 PROCEDURE — 1125F PR PAIN SEVERITY QUANTIFIED, PAIN PRESENT: ICD-10-PCS | Mod: CPTII,S$GLB,, | Performed by: NURSE PRACTITIONER

## 2021-08-26 PROCEDURE — 99213 OFFICE O/P EST LOW 20 MIN: CPT | Mod: S$GLB,,, | Performed by: NURSE PRACTITIONER

## 2021-08-26 PROCEDURE — 1160F PR REVIEW ALL MEDS BY PRESCRIBER/CLIN PHARMACIST DOCUMENTED: ICD-10-PCS | Mod: CPTII,S$GLB,, | Performed by: NURSE PRACTITIONER

## 2021-08-26 PROCEDURE — 1159F PR MEDICATION LIST DOCUMENTED IN MEDICAL RECORD: ICD-10-PCS | Mod: CPTII,S$GLB,, | Performed by: NURSE PRACTITIONER

## 2021-08-26 PROCEDURE — 3079F DIAST BP 80-89 MM HG: CPT | Mod: CPTII,S$GLB,, | Performed by: NURSE PRACTITIONER

## 2021-08-26 PROCEDURE — 3079F PR MOST RECENT DIASTOLIC BLOOD PRESSURE 80-89 MM HG: ICD-10-PCS | Mod: CPTII,S$GLB,, | Performed by: NURSE PRACTITIONER

## 2021-08-26 PROCEDURE — 99999 PR PBB SHADOW E&M-EST. PATIENT-LVL III: CPT | Mod: PBBFAC,,, | Performed by: NURSE PRACTITIONER

## 2021-08-26 PROCEDURE — 1160F RVW MEDS BY RX/DR IN RCRD: CPT | Mod: CPTII,S$GLB,, | Performed by: NURSE PRACTITIONER

## 2021-08-26 PROCEDURE — 1125F AMNT PAIN NOTED PAIN PRSNT: CPT | Mod: CPTII,S$GLB,, | Performed by: NURSE PRACTITIONER

## 2021-08-26 PROCEDURE — 99213 PR OFFICE/OUTPT VISIT, EST, LEVL III, 20-29 MIN: ICD-10-PCS | Mod: S$GLB,,, | Performed by: NURSE PRACTITIONER

## 2021-08-26 PROCEDURE — 3074F SYST BP LT 130 MM HG: CPT | Mod: CPTII,S$GLB,, | Performed by: NURSE PRACTITIONER

## 2021-08-26 PROCEDURE — 99999 PR PBB SHADOW E&M-EST. PATIENT-LVL III: ICD-10-PCS | Mod: PBBFAC,,, | Performed by: NURSE PRACTITIONER

## 2021-08-26 PROCEDURE — 3008F PR BODY MASS INDEX (BMI) DOCUMENTED: ICD-10-PCS | Mod: CPTII,S$GLB,, | Performed by: NURSE PRACTITIONER

## 2021-09-17 ENCOUNTER — TELEPHONE (OUTPATIENT)
Dept: ADMINISTRATIVE | Facility: OTHER | Age: 41
End: 2021-09-17

## 2021-09-27 ENCOUNTER — TELEPHONE (OUTPATIENT)
Dept: PAIN MEDICINE | Facility: CLINIC | Age: 41
End: 2021-09-27

## 2021-09-27 ENCOUNTER — PATIENT MESSAGE (OUTPATIENT)
Dept: PAIN MEDICINE | Facility: CLINIC | Age: 41
End: 2021-09-27

## 2021-10-12 ENCOUNTER — PATIENT OUTREACH (OUTPATIENT)
Dept: ADMINISTRATIVE | Facility: OTHER | Age: 41
End: 2021-10-12

## 2021-10-12 ENCOUNTER — TELEPHONE (OUTPATIENT)
Dept: PAIN MEDICINE | Facility: CLINIC | Age: 41
End: 2021-10-12

## 2022-05-17 ENCOUNTER — PATIENT MESSAGE (OUTPATIENT)
Dept: INTERNAL MEDICINE | Facility: CLINIC | Age: 42
End: 2022-05-17
Payer: COMMERCIAL

## 2022-05-17 DIAGNOSIS — F41.1 GAD (GENERALIZED ANXIETY DISORDER): ICD-10-CM

## 2022-05-17 RX ORDER — ESCITALOPRAM OXALATE 20 MG/1
20 TABLET ORAL DAILY
Qty: 90 TABLET | Refills: 1 | Status: SHIPPED | OUTPATIENT
Start: 2022-05-17 | End: 2022-11-11

## 2022-05-17 NOTE — TELEPHONE ENCOUNTER
Care Due:                  Date            Visit Type   Department     Provider  --------------------------------------------------------------------------------                                EP -                              PRIMARY      [facility] INTERNAL  Last Visit: 05-      CARE ([facility])   MEDICINE       Rosa Martinez  Next Visit: None Scheduled  None         None Found                                                            Last  Test          Frequency    Reason                     Performed    Due Date  --------------------------------------------------------------------------------    Office Visit  12 months..  EScitalopram, irbesartan,   05- 05-                             triamterene-hydrochloroth                             iazide...................    CMP.........  12 months..  irbesartan,                01- 01-                             triamterene-hydrochloroth                             iazide...................    [employer] Embedded Care Gaps. Reference number: [id number]. 5/17/2022   11:03:53 AM CDT

## 2022-05-20 ENCOUNTER — PATIENT MESSAGE (OUTPATIENT)
Dept: INTERNAL MEDICINE | Facility: CLINIC | Age: 42
End: 2022-05-20
Payer: COMMERCIAL

## 2022-05-20 DIAGNOSIS — I10 ESSENTIAL HYPERTENSION: ICD-10-CM

## 2022-05-20 DIAGNOSIS — I10 ESSENTIAL HYPERTENSION: Primary | ICD-10-CM

## 2022-05-20 RX ORDER — TRIAMTERENE/HYDROCHLOROTHIAZID 37.5-25 MG
1 TABLET ORAL DAILY
Qty: 90 TABLET | Refills: 0 | Status: SHIPPED | OUTPATIENT
Start: 2022-05-20 | End: 2022-08-18

## 2022-05-20 NOTE — TELEPHONE ENCOUNTER
No new care gaps identified.  [facility] Embedded Care Gaps. Reference number: [id number]. 5/20/2022   7:57:47 AM CDT

## 2022-05-31 ENCOUNTER — PATIENT MESSAGE (OUTPATIENT)
Dept: ADMINISTRATIVE | Facility: HOSPITAL | Age: 42
End: 2022-05-31
Payer: COMMERCIAL

## 2022-07-08 ENCOUNTER — PATIENT MESSAGE (OUTPATIENT)
Dept: INTERNAL MEDICINE | Facility: CLINIC | Age: 42
End: 2022-07-08
Payer: COMMERCIAL

## 2022-07-08 DIAGNOSIS — Z00.00 PREVENTATIVE HEALTH CARE: Primary | ICD-10-CM

## 2022-07-15 ENCOUNTER — LAB VISIT (OUTPATIENT)
Dept: LAB | Facility: HOSPITAL | Age: 42
End: 2022-07-15
Attending: INTERNAL MEDICINE
Payer: COMMERCIAL

## 2022-07-15 DIAGNOSIS — Z00.00 PREVENTATIVE HEALTH CARE: ICD-10-CM

## 2022-07-15 LAB
ALBUMIN SERPL BCP-MCNC: 4.5 G/DL (ref 3.5–5.2)
ALP SERPL-CCNC: 51 U/L (ref 38–126)
ALT SERPL W/O P-5'-P-CCNC: 51 U/L (ref 10–44)
ANION GAP SERPL CALC-SCNC: 9 MMOL/L (ref 8–16)
AST SERPL-CCNC: 35 U/L (ref 15–46)
BASOPHILS # BLD AUTO: 0.04 K/UL (ref 0–0.2)
BASOPHILS NFR BLD: 0.9 % (ref 0–1.9)
BILIRUB SERPL-MCNC: 0.4 MG/DL (ref 0.1–1)
CALCIUM SERPL-MCNC: 8.9 MG/DL (ref 8.7–10.5)
CHLORIDE SERPL-SCNC: 99 MMOL/L (ref 95–110)
CHOLEST SERPL-MCNC: 150 MG/DL (ref 120–199)
CHOLEST/HDLC SERPL: 4.1 {RATIO} (ref 2–5)
CO2 SERPL-SCNC: 31 MMOL/L (ref 23–29)
COMPLEXED PSA SERPL-MCNC: 0.73 NG/ML (ref 0–4)
CREAT SERPL-MCNC: 0.98 MG/DL (ref 0.5–1.4)
DIFFERENTIAL METHOD: ABNORMAL
EOSINOPHIL # BLD AUTO: 0.3 K/UL (ref 0–0.5)
EOSINOPHIL NFR BLD: 6.2 % (ref 0–8)
ERYTHROCYTE [DISTWIDTH] IN BLOOD BY AUTOMATED COUNT: 12.9 % (ref 11.5–14.5)
EST. GFR  (AFRICAN AMERICAN): >60 ML/MIN/1.73 M^2
EST. GFR  (NON AFRICAN AMERICAN): >60 ML/MIN/1.73 M^2
GLUCOSE SERPL-MCNC: 86 MG/DL (ref 70–110)
HCT VFR BLD AUTO: 42 % (ref 40–54)
HDLC SERPL-MCNC: 37 MG/DL (ref 40–75)
HDLC SERPL: 24.7 % (ref 20–50)
HGB BLD-MCNC: 14 G/DL (ref 14–18)
IMM GRANULOCYTES # BLD AUTO: 0.02 K/UL (ref 0–0.04)
IMM GRANULOCYTES NFR BLD AUTO: 0.4 % (ref 0–0.5)
LDLC SERPL CALC-MCNC: 98.8 MG/DL (ref 63–159)
LYMPHOCYTES # BLD AUTO: 2.3 K/UL (ref 1–4.8)
LYMPHOCYTES NFR BLD: 49.9 % (ref 18–48)
MCH RBC QN AUTO: 28 PG (ref 27–31)
MCHC RBC AUTO-ENTMCNC: 33.3 G/DL (ref 32–36)
MCV RBC AUTO: 84 FL (ref 82–98)
MONOCYTES # BLD AUTO: 0.4 K/UL (ref 0.3–1)
MONOCYTES NFR BLD: 7.5 % (ref 4–15)
NEUTROPHILS # BLD AUTO: 1.6 K/UL (ref 1.8–7.7)
NEUTROPHILS NFR BLD: 35.1 % (ref 38–73)
NONHDLC SERPL-MCNC: 113 MG/DL
NRBC BLD-RTO: 0 /100 WBC
PLATELET # BLD AUTO: 274 K/UL (ref 150–450)
PMV BLD AUTO: 9.7 FL (ref 9.2–12.9)
POTASSIUM SERPL-SCNC: 3.5 MMOL/L (ref 3.5–5.1)
PROT SERPL-MCNC: 7.5 G/DL (ref 6–8.4)
RBC # BLD AUTO: 5 M/UL (ref 4.6–6.2)
SODIUM SERPL-SCNC: 139 MMOL/L (ref 136–145)
TRIGL SERPL-MCNC: 71 MG/DL (ref 30–150)
TSH SERPL DL<=0.005 MIU/L-ACNC: 1.39 UIU/ML (ref 0.4–4)
UUN UR-MCNC: 16 MG/DL (ref 2–20)
WBC # BLD AUTO: 4.65 K/UL (ref 3.9–12.7)

## 2022-07-15 PROCEDURE — 85025 COMPLETE CBC W/AUTO DIFF WBC: CPT | Mod: PO | Performed by: INTERNAL MEDICINE

## 2022-07-15 PROCEDURE — 80061 LIPID PANEL: CPT | Performed by: INTERNAL MEDICINE

## 2022-07-15 PROCEDURE — 84443 ASSAY THYROID STIM HORMONE: CPT | Mod: PO | Performed by: INTERNAL MEDICINE

## 2022-07-15 PROCEDURE — 80053 COMPREHEN METABOLIC PANEL: CPT | Mod: PO | Performed by: INTERNAL MEDICINE

## 2022-07-15 PROCEDURE — 36415 COLL VENOUS BLD VENIPUNCTURE: CPT | Mod: PO | Performed by: INTERNAL MEDICINE

## 2022-07-15 PROCEDURE — 84153 ASSAY OF PSA TOTAL: CPT | Performed by: INTERNAL MEDICINE

## 2022-07-29 ENCOUNTER — OFFICE VISIT (OUTPATIENT)
Dept: INTERNAL MEDICINE | Facility: CLINIC | Age: 42
End: 2022-07-29
Payer: COMMERCIAL

## 2022-07-29 VITALS
SYSTOLIC BLOOD PRESSURE: 100 MMHG | BODY MASS INDEX: 37.49 KG/M2 | WEIGHT: 282.88 LBS | HEART RATE: 74 BPM | DIASTOLIC BLOOD PRESSURE: 80 MMHG | HEIGHT: 73 IN | OXYGEN SATURATION: 98 %

## 2022-07-29 DIAGNOSIS — I10 ESSENTIAL HYPERTENSION: ICD-10-CM

## 2022-07-29 DIAGNOSIS — Z00.00 PREVENTATIVE HEALTH CARE: Primary | ICD-10-CM

## 2022-07-29 DIAGNOSIS — F41.1 GAD (GENERALIZED ANXIETY DISORDER): ICD-10-CM

## 2022-07-29 DIAGNOSIS — G56.21 ENTRAPMENT OF RIGHT ULNAR NERVE: ICD-10-CM

## 2022-07-29 DIAGNOSIS — E66.9 OBESITY, CLASS II, BMI 35-39.9, ISOLATED (SEE ACTUAL BMI): ICD-10-CM

## 2022-07-29 PROCEDURE — 3079F DIAST BP 80-89 MM HG: CPT | Mod: CPTII,S$GLB,, | Performed by: INTERNAL MEDICINE

## 2022-07-29 PROCEDURE — 3079F PR MOST RECENT DIASTOLIC BLOOD PRESSURE 80-89 MM HG: ICD-10-PCS | Mod: CPTII,S$GLB,, | Performed by: INTERNAL MEDICINE

## 2022-07-29 PROCEDURE — 99999 PR PBB SHADOW E&M-EST. PATIENT-LVL IV: ICD-10-PCS | Mod: PBBFAC,,, | Performed by: INTERNAL MEDICINE

## 2022-07-29 PROCEDURE — 3074F SYST BP LT 130 MM HG: CPT | Mod: CPTII,S$GLB,, | Performed by: INTERNAL MEDICINE

## 2022-07-29 PROCEDURE — 1159F MED LIST DOCD IN RCRD: CPT | Mod: CPTII,S$GLB,, | Performed by: INTERNAL MEDICINE

## 2022-07-29 PROCEDURE — 1160F RVW MEDS BY RX/DR IN RCRD: CPT | Mod: CPTII,S$GLB,, | Performed by: INTERNAL MEDICINE

## 2022-07-29 PROCEDURE — 1159F PR MEDICATION LIST DOCUMENTED IN MEDICAL RECORD: ICD-10-PCS | Mod: CPTII,S$GLB,, | Performed by: INTERNAL MEDICINE

## 2022-07-29 PROCEDURE — 3074F PR MOST RECENT SYSTOLIC BLOOD PRESSURE < 130 MM HG: ICD-10-PCS | Mod: CPTII,S$GLB,, | Performed by: INTERNAL MEDICINE

## 2022-07-29 PROCEDURE — 3008F PR BODY MASS INDEX (BMI) DOCUMENTED: ICD-10-PCS | Mod: CPTII,S$GLB,, | Performed by: INTERNAL MEDICINE

## 2022-07-29 PROCEDURE — 99396 PR PREVENTIVE VISIT,EST,40-64: ICD-10-PCS | Mod: S$GLB,,, | Performed by: INTERNAL MEDICINE

## 2022-07-29 PROCEDURE — 3008F BODY MASS INDEX DOCD: CPT | Mod: CPTII,S$GLB,, | Performed by: INTERNAL MEDICINE

## 2022-07-29 PROCEDURE — 99396 PREV VISIT EST AGE 40-64: CPT | Mod: S$GLB,,, | Performed by: INTERNAL MEDICINE

## 2022-07-29 PROCEDURE — 99999 PR PBB SHADOW E&M-EST. PATIENT-LVL IV: CPT | Mod: PBBFAC,,, | Performed by: INTERNAL MEDICINE

## 2022-07-29 PROCEDURE — 1160F PR REVIEW ALL MEDS BY PRESCRIBER/CLIN PHARMACIST DOCUMENTED: ICD-10-PCS | Mod: CPTII,S$GLB,, | Performed by: INTERNAL MEDICINE

## 2022-07-29 NOTE — PROGRESS NOTES
Subjective:       Patient ID: Palu Espinoza is a 42 y.o. male.    Chief Complaint: Annual Exam    HPI 42-year-old male presents to clinic today for annual physical follow-up hypertension and anxiety compliant with medicines denies side effects or issues.  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  Does report a little soreness in the medial aspect of his elbow.  Review of Systems    otherwise negative  Objective:      Physical Exam  General: Well-appearing, well-nourished.  No distress  HEENT: conjunctivae are normal.  Pupils are equal and reative to light.  TM's are clear and intact bilaterally.  Hearing is grossly normal.  Nasopharynx is clear.  Oropharynx is clear.  Neck: Supple.  No thyroid megaly.  No bruits.  Lymph: No cervical or supraclavicular adenopathy.  Heart: Regular rate and rhythm, without murmur, rub or gallop.  Lungs: Clear to auscultation; respiratory effort normal.  Abdomen: Soft, nontender, nondistended.  Normoactive bowel sounds.  No hepatomegaly.  No masses.  Extremities: Good distal pulses.  No edema.  Psych: Oriented to time person place.  Judgment and insight seem unimpaired.  Mood and affect are appropriate.  Muscle skeletal good range of motion good distal pulses  Assessment:       Problem List Items Addressed This Visit     Essential hypertension    CHEMA (generalized anxiety disorder)    Obesity, Class II, BMI 35-39.9, isolated (see actual BMI)      Other Visit Diagnoses     Preventative health care    -  Primary    Compression of right ulnar nerve              Plan:       Paul was seen today for annual exam.    Diagnoses and all orders for this visit:    Preventative health care  Healthcare maintenance performed, reviewed, updated and counseled today.  Essential  hypertension  Controlled.  Continue current medical regimen.  Prescription refills addressed.  Followup advised. See after visit summary.  Obesity, Class II, BMI 35-39.9, isolated (see actual BMI)  Recommend weight loss  CHEMA (generalized anxiety disorder)  Controlled.  Continue current medical regimen.  Prescription refills addressed.  Followup advised. See after visit summary.  Compression  of right ulnar nerve  Recommended are good of a work environment if this is persistent he will let me know and if he develops more pain in the neck shoulder or elbow may require imaging and nerve conduction study.

## 2022-08-18 DIAGNOSIS — I10 ESSENTIAL HYPERTENSION: ICD-10-CM

## 2022-08-18 RX ORDER — TRIAMTERENE/HYDROCHLOROTHIAZID 37.5-25 MG
TABLET ORAL
Qty: 90 TABLET | Refills: 3 | Status: SHIPPED | OUTPATIENT
Start: 2022-08-18 | End: 2023-08-21 | Stop reason: SDUPTHER

## 2022-08-18 NOTE — TELEPHONE ENCOUNTER
Refill Decision Note   Paul Espinoza  is requesting a refill authorization.  Brief Assessment and Rationale for Refill:  Approve     Medication Therapy Plan:       Medication Reconciliation Completed: No   Comments:     No Care Gaps recommended.     Note composed:6:36 AM 08/18/2022

## 2022-08-18 NOTE — TELEPHONE ENCOUNTER
No new care gaps identified.  [facility] Embedded Care Gaps. Reference number: [id number]. 8/18/2022   1:29:28 AM HENNAT

## 2022-08-20 ENCOUNTER — HOSPITAL ENCOUNTER (EMERGENCY)
Facility: HOSPITAL | Age: 42
Discharge: HOME | End: 2022-08-20
Attending: EMERGENCY MEDICINE
Payer: COMMERCIAL

## 2022-08-20 VITALS
WEIGHT: 270 LBS | DIASTOLIC BLOOD PRESSURE: 78 MMHG | HEART RATE: 90 BPM | TEMPERATURE: 100 F | HEIGHT: 73 IN | OXYGEN SATURATION: 99 % | SYSTOLIC BLOOD PRESSURE: 128 MMHG | RESPIRATION RATE: 18 BRPM | BODY MASS INDEX: 35.78 KG/M2

## 2022-08-20 DIAGNOSIS — U07.1 COVID-19 VIRUS DETECTED: ICD-10-CM

## 2022-08-20 DIAGNOSIS — U07.1 COVID-19: Primary | ICD-10-CM

## 2022-08-20 LAB — SARS-COV-2 RDRP RESP QL NAA+PROBE: POSITIVE

## 2022-08-20 PROCEDURE — U0002 COVID-19 LAB TEST NON-CDC: HCPCS | Mod: ER | Performed by: EMERGENCY MEDICINE

## 2022-08-20 PROCEDURE — 99282 EMERGENCY DEPT VISIT SF MDM: CPT | Mod: ER

## 2022-08-20 NOTE — Clinical Note
"Paul "Prerna Espinoza was seen and treated in our emergency department on 8/20/2022.     COVID-19 is present in our communities across the state. There is limited testing for COVID at this time, so not all patients can be tested. In this situation, your employee meets the following criteria:    Paul Espinoza has met the criteria for COVID-19 testing and has a POSITIVE result. He can return to work once they are asymptomatic for 24 hours without the use of fever reducing medications AND at least five days from the first positive result. A mask is recommended for 5 days post quarantine.     If you have any questions or concerns, or if I can be of further assistance, please do not hesitate to contact me.    Sincerely,             Jerzy Bedolla MD"

## 2023-01-28 ENCOUNTER — PATIENT MESSAGE (OUTPATIENT)
Dept: ADMINISTRATIVE | Facility: HOSPITAL | Age: 43
End: 2023-01-28
Payer: COMMERCIAL

## 2023-01-28 DIAGNOSIS — Z12.11 SCREENING FOR COLON CANCER: ICD-10-CM

## 2023-01-30 ENCOUNTER — OFFICE VISIT (OUTPATIENT)
Dept: INTERNAL MEDICINE | Facility: CLINIC | Age: 43
End: 2023-01-30
Payer: COMMERCIAL

## 2023-01-30 VITALS — DIASTOLIC BLOOD PRESSURE: 85 MMHG | SYSTOLIC BLOOD PRESSURE: 120 MMHG

## 2023-01-30 DIAGNOSIS — Z12.11 SCREEN FOR COLON CANCER: ICD-10-CM

## 2023-01-30 DIAGNOSIS — F40.10 SOCIAL PHOBIA: ICD-10-CM

## 2023-01-30 DIAGNOSIS — I10 ESSENTIAL HYPERTENSION: Primary | ICD-10-CM

## 2023-01-30 DIAGNOSIS — Z00.00 PREVENTATIVE HEALTH CARE: ICD-10-CM

## 2023-01-30 DIAGNOSIS — F41.1 GAD (GENERALIZED ANXIETY DISORDER): ICD-10-CM

## 2023-01-30 PROCEDURE — 1159F MED LIST DOCD IN RCRD: CPT | Mod: CPTII,95,, | Performed by: INTERNAL MEDICINE

## 2023-01-30 PROCEDURE — 1159F PR MEDICATION LIST DOCUMENTED IN MEDICAL RECORD: ICD-10-PCS | Mod: CPTII,95,, | Performed by: INTERNAL MEDICINE

## 2023-01-30 PROCEDURE — 99214 OFFICE O/P EST MOD 30 MIN: CPT | Mod: 95,,, | Performed by: INTERNAL MEDICINE

## 2023-01-30 PROCEDURE — 1160F PR REVIEW ALL MEDS BY PRESCRIBER/CLIN PHARMACIST DOCUMENTED: ICD-10-PCS | Mod: CPTII,95,, | Performed by: INTERNAL MEDICINE

## 2023-01-30 PROCEDURE — 3074F SYST BP LT 130 MM HG: CPT | Mod: CPTII,95,, | Performed by: INTERNAL MEDICINE

## 2023-01-30 PROCEDURE — 99214 PR OFFICE/OUTPT VISIT, EST, LEVL IV, 30-39 MIN: ICD-10-PCS | Mod: 95,,, | Performed by: INTERNAL MEDICINE

## 2023-01-30 PROCEDURE — 1160F RVW MEDS BY RX/DR IN RCRD: CPT | Mod: CPTII,95,, | Performed by: INTERNAL MEDICINE

## 2023-01-30 PROCEDURE — 3079F PR MOST RECENT DIASTOLIC BLOOD PRESSURE 80-89 MM HG: ICD-10-PCS | Mod: CPTII,95,, | Performed by: INTERNAL MEDICINE

## 2023-01-30 PROCEDURE — 3074F PR MOST RECENT SYSTOLIC BLOOD PRESSURE < 130 MM HG: ICD-10-PCS | Mod: CPTII,95,, | Performed by: INTERNAL MEDICINE

## 2023-01-30 PROCEDURE — 3079F DIAST BP 80-89 MM HG: CPT | Mod: CPTII,95,, | Performed by: INTERNAL MEDICINE

## 2023-01-30 RX ORDER — LORAZEPAM 0.5 MG/1
0.5 TABLET ORAL DAILY PRN
Qty: 30 TABLET | Refills: 1 | Status: SHIPPED | OUTPATIENT
Start: 2023-01-30 | End: 2023-06-16 | Stop reason: SDUPTHER

## 2023-01-30 NOTE — PROGRESS NOTES
Subjective:       Patient ID: Paul Espinoza is a 43 y.o. male.    Chief Complaint: Hypertension    HPI 43-year-old male presents to clinic today virtual follow-up hypertension patient reports he has been exercising 3 times per week in eating and living more healthy.  Anxiety is currently controlled.  Review of Systems    Otherwise negative  Objective:      Physical Exam  General: Well-appearing, well-nourished.  No distress  HEENT: conjunctivae are normal.   Hearing is grossly normal.  Nasopharynx april congestion  Oropharynx is clear.  Neck: Supple.  Visually No thyroid megaly.   Heart: Regular rate   Lungs:  respiratory effort normal.  Abdomen:  nontender  Extremities:   No edema.  Psych: Oriented to time person place.  Judgment and insight seem unimpaired.  Mood and affect are appropriate.  Assessment:       Problem List Items Addressed This Visit       Social phobia    Essential hypertension - Primary    Relevant Orders    CBC Auto Differential    Comprehensive Metabolic Panel    TSH    Lipid Panel    PSA, Screening    Hemoglobin A1C    CHEMA (generalized anxiety disorder)     Other Visit Diagnoses       Screen for colon cancer        Relevant Orders    Case Request Endoscopy: COLONOSCOPY (Completed)    Preventative health care        Relevant Orders    CBC Auto Differential    Comprehensive Metabolic Panel    TSH    Lipid Panel    PSA, Screening    Hemoglobin A1C              Plan:       Paul was seen today for hypertension.    Diagnoses and all orders for this visit:    Essential hypertension  -     CBC Auto Differential; Future  -     Comprehensive Metabolic Panel; Future  -     TSH; Future  -     Lipid Panel; Future  -     PSA, Screening; Future  -     Hemoglobin A1C; Standing  Controlled.  Continue current medical regimen.  Prescription refills addressed.  Followup advised. See after visit summary.  CHEMA (generalized anxiety disorder)    Soc Controlled.  Continue current medical regimen.  Prescription refills  addressed.  Followup advised. See after visit summary.  ial phobia  Controlled.  Continue current medical regimen.  Prescription refills addressed.  Followup advised. See after visit summary.  Screen for colon cancer  -     Case Request Endoscopy: COLONOSCOPY    Preventative health care  -     CBC Auto Differential; Future  -     Comprehensive Metabolic Panel; Future  -     TSH; Future  -     Lipid Panel; Future  -     PSA, Screening; Future  -     Hemoglobin A1C; Standing  Scheduled  The patient location is: [facility]   The chief complaint leading to consultation is: htn ermias    Visit type: audiovisual    Face to Face time with patient: 20   20 minutes of total time spent on the encounter, which includes face to face time and non-face to face time preparing to see the patient (eg, review of tests), Obtaining and/or reviewing separately obtained history, Documenting clinical information in the electronic or other health record, Independently interpreting results (not separately reported) and communicating results to the patient/family/caregiver, or Care coordination (not separately reported).         Each patient to whom he or she provides medical services by telemedicine is:  (1) informed of the relationship between the physician and patient and the respective role of any other health care provider with respect to management of the patient; and (2) notified that he or she may decline to receive medical services by telemedicine and may withdraw from such care at any time.    Notes:

## 2023-04-26 ENCOUNTER — TELEPHONE (OUTPATIENT)
Dept: GASTROENTEROLOGY | Facility: CLINIC | Age: 43
End: 2023-04-26
Payer: COMMERCIAL

## 2023-05-04 ENCOUNTER — PATIENT OUTREACH (OUTPATIENT)
Dept: ADMINISTRATIVE | Facility: HOSPITAL | Age: 43
End: 2023-05-04
Payer: COMMERCIAL

## 2023-05-04 NOTE — PROGRESS NOTES
Non-compliant GAP report chart review - Chart review completed for the following HM test if overdue  (Mammogram, Colonoscopy, Cervical Cancer Screening,  Diabetic lab testing, and/or Dilated EYE EXAM)     Care Everywhere and Media reports - updates requested and reviewed.        Health Maintenance Due   Topic Date Due    HIV Screening  Never done    Hemoglobin A1c (Diabetic Prevention Screening)  Never done    COVID-19 Vaccine (4 - Booster for Moderna series) 02/17/2022    Colorectal Cancer Screening  Never done

## 2023-06-16 DIAGNOSIS — F41.1 GAD (GENERALIZED ANXIETY DISORDER): ICD-10-CM

## 2023-06-16 DIAGNOSIS — F40.10 SOCIAL PHOBIA: ICD-10-CM

## 2023-06-16 NOTE — TELEPHONE ENCOUNTER
Care Due:                  Date            Visit Type   Department     Provider  --------------------------------------------------------------------------------                                ESTABLISHED                              PATIENT -    [facility] INTERNAL  Last Visit: 01-      VIRTUAL      MEDICINE       Rosa Martinez                              EP -                              PRIMARY      [facility] INTERNAL  Next Visit: 07-      CARE ([facility])   MEDICINE       Rosa Martinez                                                            Last  Test          Frequency    Reason                     Performed    Due Date  --------------------------------------------------------------------------------    CMP.........  12 months..  irbesartan,                07-   07-                             triamterene-hydrochloroth                             iazide...................    [employer] Embedded Care Due Messages. Reference number: [id number].   6/16/2023 12:01:03 PM CDT

## 2023-06-20 RX ORDER — LORAZEPAM 0.5 MG/1
0.5 TABLET ORAL DAILY PRN
Qty: 30 TABLET | Refills: 0 | Status: SHIPPED | OUTPATIENT
Start: 2023-06-20

## 2023-07-06 ENCOUNTER — PATIENT MESSAGE (OUTPATIENT)
Dept: INTERNAL MEDICINE | Facility: CLINIC | Age: 43
End: 2023-07-06
Payer: COMMERCIAL

## 2023-07-26 ENCOUNTER — PATIENT MESSAGE (OUTPATIENT)
Dept: GASTROENTEROLOGY | Facility: CLINIC | Age: 43
End: 2023-07-26
Payer: COMMERCIAL

## 2023-07-26 RX ORDER — SODIUM, POTASSIUM,MAG SULFATES 17.5-3.13G
1 SOLUTION, RECONSTITUTED, ORAL ORAL DAILY
Qty: 1 KIT | Refills: 0 | Status: CANCELLED | OUTPATIENT
Start: 2023-07-26 | End: 2023-07-28

## 2023-07-26 RX ORDER — SOD SULF/POT CHLORIDE/MAG SULF 1.479 G
12 TABLET ORAL DAILY
Qty: 24 TABLET | Refills: 0 | Status: SHIPPED | OUTPATIENT
Start: 2023-07-26 | End: 2023-07-26

## 2023-07-26 RX ORDER — SODIUM, POTASSIUM,MAG SULFATES 17.5-3.13G
1 SOLUTION, RECONSTITUTED, ORAL ORAL DAILY
Qty: 1 KIT | Refills: 0 | Status: SHIPPED | OUTPATIENT
Start: 2023-07-26 | End: 2023-08-21

## 2023-07-26 NOTE — TELEPHONE ENCOUNTER
Contacted patient to schedule a screening colonoscopy. Patient agreed to schedule. Patient requested Sutab for colonoscopy prep.    Endoscopy Scheduling Questionnaire:         Are you taking any blood thinners? yes               If Yes  Have you been on them for longer than one year? N/a    2. Have you been diagnosed with Diverticulitis in past three months?  no    3. Are you on dialysis or have Kidney Disease? no    4. Previous Colonoscopy?  no         If yes Do you have a history of colon polyps?  N/a    5. Family History of Colon Cancer no         Relation **       Age at Diagnosis **      6. Are you a diabetic?  no    7. Do you have a history of constipation?  no      Procedure scheduled with Dr. Yin  on  8/18/2023    The prep being used is SUTAB     The patient's prep instructions were sent by [facility] Instructions    [facility] [address]  Clinic Office [phone]  Endoscopy Lab [phone]    You are scheduled for a Colonoscopy with Dr. Yin on 8/18/2023 at [facility] in [place].    Check in at the Hospital -1st floor, Information desk. A covid test will be required 3 days before your procedure.  Call [phone] to reschedule.    An adult friend/family member must come with you to drive you home.  You cannot drive, take a taxi, Uber/Lyft or bus to leave the Endoscopy Center alone.  If you do not have someone to drive you home, your test will be cancelled.     Please follow the directions of your doctor if you take any pills that thin your blood. If you take these meds: Aggrenox, Brilinta, Effient, Eliquis, Lovenox, Plavix, Pletal, Pradaxa, Ticilid, Xarelto or Coumadin, let the doctor's office know.    DON'T: On the morning of the test do not take insulin or pills for diabetes.     DO: On the morning of the test, do take any pills for blood pressure, heart, anti-rejection and or seizures with a small sip of water. Bring any inhalers with  you.    To have a good prep, you must follow these instructions - please do not use the directions from the pharmacy.    The doctor will send a prescription for the SUTAB.    The Day Before the test:    You can only drink CLEAR LIQUIDS the whole day before your test.  You can't eat any food for the whole day.    You CAN have:  Water, Coffee or decaf coffee (no milk or cream)  Tea  Soft drinks - regular and sugar free  Jell-O (green or yellow)  Apple Juice, grape juice, white cranberry juice  Gatorade, Power Aid, Crystal Light, Wilber Aid  Lemonade and Limeade  Bouillon, clear soup  Snowball, popsicles  YOU CAN'T DRINK ANYTHING RED, PURPLE ORANGE OR BLUE   YOU CAN'T DRINK ALCOHOL  ONLY DRINK WHAT IS ON THE LIST      At 5 pm the night before your test:    Open the bottle of 12 tablets. Fill the provided cup with water up to the line = 16 oz. Swallow each pill with water. Drink the rest of the water in the cup in 20 minutes.    At 6 pm:  Fill the cup with water up to the line = 16 oz. Drink the cup of water in 30 minutes.    At 7 pm:  Fill the cup with water up to the line = 16 oz. Drink the cup of water in 30 minutes.     Continue to drink water or clear liquids until you go to sleep.      The Day of the test - We will call you 2 days before your test to tell you what time to get to the hospital. We will also tell you when to do the next steps.    5 hours before you come to the hospital (this may be in the middle of the night): ___________________= time  Open the bottle of 12 tablets. Fill the cup with water up to the line = 16 oz. Swallow each pill with water. Drink the rest of the water in the cup in 20 minutes.    At 4 hours before you come to the hospital: ______________= time  Fill the cup with water up to the line = 16 oz. Drink the cup of water in 30 minutes.     At 3 hours before you come to the hospital: ______________= time    YOU CAN'T EAT OR DRINK ANYTHING ELSE ONCE YOU FINISH THE WATER AT _____________ =  TIME    Leave all valuables and jewelry at home. You will be at the hospital for 2-4 hours.    Call the Endoscopy department at [phone] with any questions about your procedure.          Please give this Coupon Code to your pharmacist.    BIN: [id number]  TAYLORN: ARYAN  GROUP: [id number]  MEMBER ID: [id number]

## 2023-07-27 ENCOUNTER — TELEPHONE (OUTPATIENT)
Dept: GASTROENTEROLOGY | Facility: CLINIC | Age: 43
End: 2023-07-27
Payer: COMMERCIAL

## 2023-07-27 NOTE — TELEPHONE ENCOUNTER
Patient was scheduled for a screening colonoscopy. Patient requested sutab for his prep and is willing to pay out of pocket for it. Pharmacy sent a message to the office stating that sutab is not covered by insurance. Prescription was then changed to suprep. Pharmacy contacted this am and changed back to sutab at patients request. Patient is willing to pay for sutab and does not want the liquid.

## 2023-08-16 ENCOUNTER — TELEPHONE (OUTPATIENT)
Dept: ENDOSCOPY | Facility: HOSPITAL | Age: 43
End: 2023-08-16
Payer: COMMERCIAL

## 2023-08-16 ENCOUNTER — LAB VISIT (OUTPATIENT)
Dept: LAB | Facility: HOSPITAL | Age: 43
End: 2023-08-16
Attending: INTERNAL MEDICINE
Payer: COMMERCIAL

## 2023-08-16 DIAGNOSIS — I10 ESSENTIAL HYPERTENSION: ICD-10-CM

## 2023-08-16 DIAGNOSIS — Z00.00 PREVENTATIVE HEALTH CARE: ICD-10-CM

## 2023-08-16 LAB
ALBUMIN SERPL BCP-MCNC: 4.4 G/DL (ref 3.5–5.2)
ALP SERPL-CCNC: 54 U/L (ref 38–126)
ALT SERPL W/O P-5'-P-CCNC: 55 U/L (ref 10–44)
ANION GAP SERPL CALC-SCNC: 11 MMOL/L (ref 8–16)
AST SERPL-CCNC: 41 U/L (ref 15–46)
BASOPHILS # BLD AUTO: 0.03 K/UL (ref 0–0.2)
BASOPHILS NFR BLD: 0.7 % (ref 0–1.9)
BILIRUB SERPL-MCNC: 0.6 MG/DL (ref 0.1–1)
CALCIUM SERPL-MCNC: 9.1 MG/DL (ref 8.7–10.5)
CHLORIDE SERPL-SCNC: 100 MMOL/L (ref 95–110)
CHOLEST SERPL-MCNC: 160 MG/DL (ref 120–199)
CHOLEST/HDLC SERPL: 4.4 {RATIO} (ref 2–5)
CO2 SERPL-SCNC: 29 MMOL/L (ref 23–29)
COMPLEXED PSA SERPL-MCNC: 0.82 NG/ML (ref 0–4)
CREAT SERPL-MCNC: 0.88 MG/DL (ref 0.5–1.4)
DIFFERENTIAL METHOD: ABNORMAL
EOSINOPHIL # BLD AUTO: 0.2 K/UL (ref 0–0.5)
EOSINOPHIL NFR BLD: 5.4 % (ref 0–8)
ERYTHROCYTE [DISTWIDTH] IN BLOOD BY AUTOMATED COUNT: 12.7 % (ref 11.5–14.5)
EST. GFR  (NO RACE VARIABLE): >60 ML/MIN/1.73 M^2
ESTIMATED AVG GLUCOSE: 117 MG/DL (ref 68–131)
GLUCOSE SERPL-MCNC: 96 MG/DL (ref 70–110)
HBA1C MFR BLD: 5.7 % (ref 4–5.6)
HCT VFR BLD AUTO: 42.9 % (ref 40–54)
HDLC SERPL-MCNC: 36 MG/DL (ref 40–75)
HDLC SERPL: 22.5 % (ref 20–50)
HGB BLD-MCNC: 14.7 G/DL (ref 14–18)
IMM GRANULOCYTES # BLD AUTO: 0.02 K/UL (ref 0–0.04)
IMM GRANULOCYTES NFR BLD AUTO: 0.5 % (ref 0–0.5)
LDLC SERPL CALC-MCNC: 106 MG/DL (ref 63–159)
LYMPHOCYTES # BLD AUTO: 2.1 K/UL (ref 1–4.8)
LYMPHOCYTES NFR BLD: 48.8 % (ref 18–48)
MCH RBC QN AUTO: 28.7 PG (ref 27–31)
MCHC RBC AUTO-ENTMCNC: 34.3 G/DL (ref 32–36)
MCV RBC AUTO: 84 FL (ref 82–98)
MONOCYTES # BLD AUTO: 0.5 K/UL (ref 0.3–1)
MONOCYTES NFR BLD: 12.5 % (ref 4–15)
NEUTROPHILS # BLD AUTO: 1.4 K/UL (ref 1.8–7.7)
NEUTROPHILS NFR BLD: 32.1 % (ref 38–73)
NONHDLC SERPL-MCNC: 124 MG/DL
NRBC BLD-RTO: 0 /100 WBC
PLATELET # BLD AUTO: 241 K/UL (ref 150–450)
PMV BLD AUTO: 10.2 FL (ref 9.2–12.9)
POTASSIUM SERPL-SCNC: 3.3 MMOL/L (ref 3.5–5.1)
PROT SERPL-MCNC: 7.9 G/DL (ref 6–8.4)
RBC # BLD AUTO: 5.12 M/UL (ref 4.6–6.2)
SODIUM SERPL-SCNC: 140 MMOL/L (ref 136–145)
TRIGL SERPL-MCNC: 90 MG/DL (ref 30–150)
TSH SERPL DL<=0.005 MIU/L-ACNC: 1.51 UIU/ML (ref 0.4–4)
UUN UR-MCNC: 16 MG/DL (ref 2–20)
WBC # BLD AUTO: 4.24 K/UL (ref 3.9–12.7)

## 2023-08-16 PROCEDURE — 80053 COMPREHEN METABOLIC PANEL: CPT | Mod: PO | Performed by: INTERNAL MEDICINE

## 2023-08-16 PROCEDURE — 84153 ASSAY OF PSA TOTAL: CPT | Performed by: INTERNAL MEDICINE

## 2023-08-16 PROCEDURE — 80061 LIPID PANEL: CPT | Performed by: INTERNAL MEDICINE

## 2023-08-16 PROCEDURE — 84443 ASSAY THYROID STIM HORMONE: CPT | Mod: PO | Performed by: INTERNAL MEDICINE

## 2023-08-16 PROCEDURE — 83036 HEMOGLOBIN GLYCOSYLATED A1C: CPT | Performed by: INTERNAL MEDICINE

## 2023-08-16 PROCEDURE — 36415 COLL VENOUS BLD VENIPUNCTURE: CPT | Mod: PO | Performed by: INTERNAL MEDICINE

## 2023-08-16 PROCEDURE — 85025 COMPLETE CBC W/AUTO DIFF WBC: CPT | Mod: PO | Performed by: INTERNAL MEDICINE

## 2023-08-16 NOTE — TELEPHONE ENCOUNTER
Spoke with patient about arrival time @. 945  Covid test =     Prep instructions reviewed: the day before the procedure, follow a clear liquid diet all day, then start the first 1/2 of prep at 5pm and take 2nd 1/2 of prep @. 445 Pt must be completely NPO when prep completed @.     645         Medications: Do not take Insulin or oral diabetic medications the day of the procedure.  Take as prescribed: heart, seizure and blood pressure medication in the morning with a sip of water (less than an ounce).  Take any breathing medications and bring inhalers to hospital with you Leave all valuables and jewelry at home.     Wear comfortable clothes to procedure to change into hospital gown You cannot drive for 24 hours after your procedure because you will receive sedation for your procedure to make you comfortable.  A ride must be provided at discharge.

## 2023-08-16 NOTE — TELEPHONE ENCOUNTER
Left messages instructing patient to call dept @ [phone] between 8am-3pm.    Arrival time to be given @ 0930  Colon/Sutab  (Message sent via [facility] portal)

## 2023-08-18 ENCOUNTER — ANESTHESIA (OUTPATIENT)
Dept: ENDOSCOPY | Facility: HOSPITAL | Age: 43
End: 2023-08-18
Payer: COMMERCIAL

## 2023-08-18 ENCOUNTER — HOSPITAL ENCOUNTER (OUTPATIENT)
Facility: HOSPITAL | Age: 43
Discharge: HOME | End: 2023-08-18
Attending: INTERNAL MEDICINE | Admitting: INTERNAL MEDICINE
Payer: COMMERCIAL

## 2023-08-18 ENCOUNTER — ANESTHESIA EVENT (OUTPATIENT)
Dept: ENDOSCOPY | Facility: HOSPITAL | Age: 43
End: 2023-08-18
Payer: COMMERCIAL

## 2023-08-18 VITALS
HEART RATE: 86 BPM | WEIGHT: 280 LBS | TEMPERATURE: 98 F | HEIGHT: 73 IN | RESPIRATION RATE: 20 BRPM | BODY MASS INDEX: 37.11 KG/M2 | SYSTOLIC BLOOD PRESSURE: 115 MMHG | DIASTOLIC BLOOD PRESSURE: 65 MMHG | OXYGEN SATURATION: 98 %

## 2023-08-18 DIAGNOSIS — Z12.11 SCREEN FOR COLON CANCER: ICD-10-CM

## 2023-08-18 PROCEDURE — 37000009 HC ANESTHESIA EA ADD 15 MINS: Performed by: INTERNAL MEDICINE

## 2023-08-18 PROCEDURE — G0121 COLON CA SCRN NOT HI RSK IND: HCPCS | Performed by: INTERNAL MEDICINE

## 2023-08-18 PROCEDURE — 25000003 PHARM REV CODE 250: Performed by: NURSE ANESTHETIST, CERTIFIED REGISTERED

## 2023-08-18 PROCEDURE — D9220A PRA ANESTHESIA: ICD-10-PCS | Mod: ANES,,, | Performed by: STUDENT IN AN ORGANIZED HEALTH CARE EDUCATION/TRAINING PROGRAM

## 2023-08-18 PROCEDURE — D9220A PRA ANESTHESIA: ICD-10-PCS | Mod: CRNA,,, | Performed by: NURSE ANESTHETIST, CERTIFIED REGISTERED

## 2023-08-18 PROCEDURE — G0121 COLON CA SCRN NOT HI RSK IND: ICD-10-PCS | Mod: ,,, | Performed by: INTERNAL MEDICINE

## 2023-08-18 PROCEDURE — G0121 COLON CA SCRN NOT HI RSK IND: HCPCS | Mod: ,,, | Performed by: INTERNAL MEDICINE

## 2023-08-18 PROCEDURE — D9220A PRA ANESTHESIA: Mod: CRNA,,, | Performed by: NURSE ANESTHETIST, CERTIFIED REGISTERED

## 2023-08-18 PROCEDURE — 25000003 PHARM REV CODE 250: Performed by: INTERNAL MEDICINE

## 2023-08-18 PROCEDURE — 37000008 HC ANESTHESIA 1ST 15 MINUTES: Performed by: INTERNAL MEDICINE

## 2023-08-18 PROCEDURE — D9220A PRA ANESTHESIA: Mod: ANES,,, | Performed by: STUDENT IN AN ORGANIZED HEALTH CARE EDUCATION/TRAINING PROGRAM

## 2023-08-18 PROCEDURE — 63600175 PHARM REV CODE 636 W HCPCS: Performed by: NURSE ANESTHETIST, CERTIFIED REGISTERED

## 2023-08-18 RX ORDER — SODIUM CHLORIDE 0.9 % (FLUSH) 0.9 %
10 SYRINGE (ML) INJECTION DAILY PRN
Status: DISCONTINUED | OUTPATIENT
Start: 2023-08-18 | End: 2023-08-18 | Stop reason: HOSPADM

## 2023-08-18 RX ORDER — LIDOCAINE HYDROCHLORIDE 20 MG/ML
INJECTION, SOLUTION EPIDURAL; INFILTRATION; INTRACAUDAL; PERINEURAL
Status: DISCONTINUED | OUTPATIENT
Start: 2023-08-18 | End: 2023-08-18

## 2023-08-18 RX ORDER — PROPOFOL 10 MG/ML
VIAL (ML) INTRAVENOUS CONTINUOUS PRN
Status: DISCONTINUED | OUTPATIENT
Start: 2023-08-18 | End: 2023-08-18

## 2023-08-18 RX ORDER — SODIUM CHLORIDE 0.9 % (FLUSH) 0.9 %
10 SYRINGE (ML) INJECTION
Status: DISCONTINUED | OUTPATIENT
Start: 2023-08-18 | End: 2023-08-18 | Stop reason: HOSPADM

## 2023-08-18 RX ORDER — DEXMEDETOMIDINE HYDROCHLORIDE 100 UG/ML
INJECTION, SOLUTION INTRAVENOUS
Status: DISCONTINUED | OUTPATIENT
Start: 2023-08-18 | End: 2023-08-18

## 2023-08-18 RX ORDER — PROPOFOL 10 MG/ML
VIAL (ML) INTRAVENOUS
Status: DISCONTINUED | OUTPATIENT
Start: 2023-08-18 | End: 2023-08-18

## 2023-08-18 RX ORDER — SODIUM CHLORIDE 9 MG/ML
INJECTION, SOLUTION INTRAVENOUS CONTINUOUS
Status: DISCONTINUED | OUTPATIENT
Start: 2023-08-18 | End: 2023-08-18 | Stop reason: HOSPADM

## 2023-08-18 RX ADMIN — PROPOFOL 150 MCG/KG/MIN: 10 INJECTION, EMULSION INTRAVENOUS at 11:08

## 2023-08-18 RX ADMIN — DEXMEDETOMIDINE HCL 8 MCG: 100 INJECTION INTRAVENOUS at 11:08

## 2023-08-18 RX ADMIN — SODIUM CHLORIDE: 0.9 INJECTION, SOLUTION INTRAVENOUS at 11:08

## 2023-08-18 RX ADMIN — SODIUM CHLORIDE: 9 INJECTION, SOLUTION INTRAVENOUS at 10:08

## 2023-08-18 RX ADMIN — LIDOCAINE HYDROCHLORIDE 50 MG: 20 INJECTION, SOLUTION EPIDURAL; INFILTRATION; INTRACAUDAL; PERINEURAL at 11:08

## 2023-08-18 RX ADMIN — GLYCOPYRROLATE 0.2 MG: 0.2 INJECTION, SOLUTION INTRAMUSCULAR; INTRAVITREAL at 11:08

## 2023-08-18 RX ADMIN — PROPOFOL 60 MG: 10 INJECTION, EMULSION INTRAVENOUS at 11:08

## 2023-08-18 NOTE — TRANSFER OF CARE
"Anesthesia Transfer of Care Note    Patient: Paul Espinoza    Procedure(s) Performed: Procedure(s) (LRB):  COLONOSCOPY (N/A)    Patient location: GI    Anesthesia Type: general    Transport from OR: Transported from OR on 6-10 L/min O2 by face mask with adequate spontaneous ventilation    Post pain: adequate analgesia    Post assessment: no apparent anesthetic complications and tolerated procedure well    Post vital signs: stable    Level of consciousness: awake and alert    Nausea/Vomiting: no nausea/vomiting    Complications: none    Transfer of care protocol was followed      Last vitals:   Visit Vitals  /78 (BP Location: Left arm, Patient Position: Lying)   Pulse 63   Temp 36.7 °C (98.1 °F) (Temporal)   Resp 18   Ht 6' 1" (1.854 m)   Wt 127 kg (280 lb)   SpO2 97%   BMI 36.94 kg/m²     "

## 2023-08-18 NOTE — H&P
Short Stay Endoscopy History and Physical    PCP - Rosa Martinez MD    Procedure - Colonoscopy  ASA - per anesthesia  Mallampati - per anesthesia  History of Anesthesia problems - no  Family history Anesthesia problems - no   Plan of anesthesia - General    HPI:  This is a 43 y.o. male here for evaluation of : asymptomatic screening exam      ROS:  Constitutional: No fevers, chills, No weight loss  CV: No chest pain  Pulm: No cough, No shortness of breath  GI: see HPI  Derm: No rash    Medical History:  has a past medical history of Allergy, Anxiety, Hydrocele, unspecified, and Transaminasemia.    Surgical History:  has a past surgical history that includes hydrocelectomy; Circumcision, primary; and Injection of anesthetic agent around medial branch nerves innervating lumbar facet joint (Bilateral, 8/25/2021).    Family History: family history includes Arthritis in his mother; Fibromyalgia in his mother; Heart disease in his paternal grandmother.. Otherwise no colon cancer, inflammatory bowel disease, or GI malignancies.    Social History:  reports that he has never smoked. He has never used smokeless tobacco. He reports current alcohol use. He reports that he does not use drugs.    Review of patient's allergies indicates:   Allergen Reactions    Norvasc [amlodipine] Other (See Comments)     Erectile dysfunction       Medications:   Medications Prior to Admission   Medication Sig Dispense Refill Last Dose    EScitalopram oxalate (LEXAPRO) 20 MG tablet TAKE 1 TABLET BY MOUTH EVERY DAY 90 tablet 3 8/17/2023    irbesartan (AVAPRO) 300 MG tablet TAKE 1 TABLET(300 MG) BY MOUTH EVERY EVENING 90 tablet 1 8/17/2023    oxybutynin (DITROPAN XL) 5 MG TR24 Take 1 tablet (5 mg total) by mouth once daily. 90 tablet 1 Past Week    triamterene-hydrochlorothiazide 37.5-25 mg (MAXZIDE-25) 37.5-25 mg per tablet TAKE 1 TABLET BY MOUTH EVERY DAY 90 tablet 3 8/17/2023    verapamiL (CALAN-SR) 240 MG CR tablet TAKE 1 TABLET (240 MG  TOTAL) BY MOUTH ONCE DAILY. 90 tablet 3 8/17/2023    AFLURIA QD 2020-21,3YR UP,,PF, 60 mcg (15 mcg x 4)/0.5 mL Syrg ADM 0.5ML IM UTD       COVID-19 vacc,mRNA,Moderna,/PF (MODERNA COVID-19 VACCINE, EUA, IM)        cyclobenzaprine (FLEXERIL) 10 MG tablet Take 1 tablet (10 mg total) by mouth 2 (two) times daily as needed for Muscle spasms. 60 tablet 3 Unknown    LORazepam (ATIVAN) 0.5 MG tablet Take 1 tablet (0.5 mg total) by mouth daily as needed for Anxiety. 30 tablet 0 More than a month    naproxen (NAPROSYN) 500 MG tablet TAKE 1 TABLET(500 MG) BY MOUTH TWICE DAILY WITH MEALS 60 tablet 0 More than a month    sildenafil (VIAGRA) 100 MG tablet Take 1 tablet (100 mg total) by mouth daily as needed for Erectile Dysfunction. 30 tablet 11 More than a month         Physical Exam:    Vital Signs:   Vitals:    08/18/23 1024   BP: 122/78   Pulse: 63   Resp: 18   Temp: 98.1 °F (36.7 °C)       General Appearance: Well appearing in no acute distress  Eyes:    No scleral icterus  ENT: Neck supple, Lips, mucosa, and tongue normal; teeth and gums normal  Abdomen: Soft, non tender, non distended with positive bowel sounds. No hepatosplenomegaly, ascites, or mass.  Extremities: 2+ pulses, no clubbing, cyanosis or edema  Skin: No rash      Labs:  Lab Results   Component Value Date    WBC 4.24 08/16/2023    HGB 14.7 08/16/2023    HCT 42.9 08/16/2023     08/16/2023    CHOL 160 08/16/2023    TRIG 90 08/16/2023    HDL 36 (L) 08/16/2023    ALT 55 (H) 08/16/2023    AST 41 08/16/2023     08/16/2023    K 3.3 (L) 08/16/2023     08/16/2023    CREATININE 0.88 08/16/2023    BUN 16 08/16/2023    CO2 29 08/16/2023    TSH 1.510 08/16/2023    PSA 0.82 08/16/2023    HGBA1C 5.7 (H) 08/16/2023       I have explained the risks and benefits of endoscopy procedures to the patient including but not limited to bleeding, perforation, infection, and death.  The patient was asked if they understand and allowed to ask any further questions to  their satisfaction.    Kavin Yin MD

## 2023-08-18 NOTE — PROVATION PATIENT INSTRUCTIONS
Discharge Summary/Instructions after an Endoscopic Procedure  Patient Name: Paul Espinoza  Patient MRN: [id number]  Patient YOB: 1980 Friday, August 18, 2023  Kavin Yin MD  Dear patient,  As a result of recent federal legislation (The Federal Cures Act), you may   receive lab or pathology results from your procedure in your [facility]   account before your physician is able to contact you. Your physician or   their representative will relay the results to you with their   recommendations at their soonest availability.  Thank you,  Your health is very important to us during the Covid Crisis. Following your   procedure today, you will receive a daily text for 2 weeks asking about   signs or symptoms of Covid 19.  Please respond to this text when you   receive it so we can follow up and keep you as safe as possible.   RESTRICTIONS:  During your procedure today, you received medications for sedation.  These   medications may affect your judgment, balance and coordination.  Therefore,   for 24 hours, you have the following restrictions:   - DO NOT drive a car, operate machinery, make legal/financial decisions,   sign important papers or drink alcohol.    ACTIVITY:  Today: no heavy lifting, straining or running due to procedural   sedation/anesthesia.  The following day: return to full activity including work.  DIET:  Eat and drink normally unless instructed otherwise.     TREATMENT FOR COMMON SIDE EFFECTS:  - Mild abdominal pain, nausea, belching, bloating or excessive gas:  rest,   eat lightly and use a heating pad.  - Sore Throat: treat with throat lozenges and/or gargle with warm salt   water.  - Because air was used during the procedure, expelling large amounts of air   from your rectum or belching is normal.  - If a bowel prep was taken, you may not have a bowel movement for 1-3 days.    This is normal.  SYMPTOMS TO WATCH FOR AND REPORT TO YOUR PHYSICIAN:  1. Abdominal pain or bloating, other than gas  cramps.  2. Chest pain.  3. Back pain.  4. Signs of infection such as: chills or fever occurring within 24 hours   after the procedure.  5. Rectal bleeding, which would show as bright red, maroon, or black stools.   (A tablespoon of blood from the rectum is not serious, especially if   hemorrhoids are present.)  6. Vomiting.  7. Weakness or dizziness.  GO DIRECTLY TO THE NEAREST EMERGENCY ROOM IF YOU HAVE ANY OF THE FOLLOWING:      Difficulty breathing              Chills and/or fever over 101 F   Persistent vomiting and/or vomiting blood   Severe abdominal pain   Severe chest pain   Black, tarry stools   Bleeding- more than one tablespoon   Any other symptom or condition that you feel may need urgent attention  Your doctor recommends these additional instructions:  If any biopsies were taken, your doctors clinic will contact you in 1 to 2   weeks with any results.  - Discharge patient to home.   - Patient has a contact number available for emergencies.  The signs and   symptoms of potential delayed complications were discussed with the   patient.  Return to normal activities tomorrow.  Written discharge   instructions were provided to the patient.   - Resume previous diet.   - Continue present medications.   - Repeat colonoscopy in 10 years for screening purposes.  For questions, problems or results please call your physician - Kavin Yin MD.  EMERGENCY PHONE NUMBER: [phone],  LAB RESULTS: [phone]  IF A COMPLICATION OR EMERGENCY SITUATION ARISES AND YOU ARE UNABLE TO REACH   YOUR PHYSICIAN - GO DIRECTLY TO THE EMERGENCY ROOM.  Kavin Yin MD  8/18/2023 11:29:42 AM  This report has been verified and signed electronically.  Dear patient,  As a result of recent federal legislation (The Federal Cures Act), you may   receive lab or pathology results from your procedure in your [facility]   account before your physician is able to contact you. Your physician or   their representative will relay  the results to you with their   recommendations at their soonest availability.  Thank you,  PROVATION

## 2023-08-18 NOTE — ANESTHESIA PREPROCEDURE EVALUATION
08/18/2023  [facility]  Anesthesia Pre-Operative Evaluation         Patient Name: Paul Espinoza  YOB: 1980  MRN: [id number]    SUBJECTIVE:     Pre-operative evaluation for Procedure(s) (LRB):  COLONOSCOPY (N/A)     08/18/2023    Paul Espinoza is a 43 y.o. male   Patient now presents for the above procedure(s).    Prev airway: none documented.     Patient Active Problem List   Diagnosis    Social phobia    Essential hypertension    CHEMA (generalized anxiety disorder)    Obesity, Class II, BMI 35-39.9, isolated (see actual BMI)    Hip weakness    Decreased ROM of lumbar spine    Pain    Lumbar spondylosis       Review of patient's allergies indicates:   Allergen Reactions    Norvasc [amlodipine] Other (See Comments)     Erectile dysfunction       Current Inpatient Medications:      No current facility-administered medications on file prior to encounter.     Current Outpatient Medications on File Prior to Encounter   Medication Sig Dispense Refill    AFLURIA QD 2020-21,3YR UP,,PF, 60 mcg (15 mcg x 4)/0.5 mL Syrg ADM 0.5ML IM UTD      COVID-19 vacc,mRNA,Moderna,/PF (MODERNA COVID-19 VACCINE, EUA, IM)       cyclobenzaprine (FLEXERIL) 10 MG tablet Take 1 tablet (10 mg total) by mouth 2 (two) times daily as needed for Muscle spasms. 60 tablet 3    EScitalopram oxalate (LEXAPRO) 20 MG tablet TAKE 1 TABLET BY MOUTH EVERY DAY 90 tablet 3    irbesartan (AVAPRO) 300 MG tablet TAKE 1 TABLET(300 MG) BY MOUTH EVERY EVENING 90 tablet 1    naproxen (NAPROSYN) 500 MG tablet TAKE 1 TABLET(500 MG) BY MOUTH TWICE DAILY WITH MEALS 60 tablet 0    sildenafil (VIAGRA) 100 MG tablet Take 1 tablet (100 mg total) by mouth daily as needed for Erectile Dysfunction. 30 tablet 11    triamterene-hydrochlorothiazide 37.5-25 mg (MAXZIDE-25) 37.5-25 mg per tablet TAKE 1 TABLET BY MOUTH EVERY DAY  90 tablet 3    verapamiL (CALAN-SR) 240 MG CR tablet TAKE 1 TABLET (240 MG TOTAL) BY MOUTH ONCE DAILY. 90 tablet 3       Past Surgical History:   Procedure Laterality Date    CIRCUMCISION, PRIMARY      hydrocelectomy      INJECTION OF ANESTHETIC AGENT AROUND MEDIAL BRANCH NERVES INNERVATING LUMBAR FACET JOINT Bilateral 8/25/2021    Procedure: Block-nerve-medial branch-lumbar; Levels- L4-5 and L5-S1;  Surgeon: Estefany Singh MD;  Location: [facility];  Service: Pain Management;  Laterality: Bilateral;  No pacemaker. No blood thinners.        OBJECTIVE:     Vital Signs Range (Last 24H):         Significant Labs:  Lab Results   Component Value Date    WBC 4.24 08/16/2023    HGB 14.7 08/16/2023    HCT 42.9 08/16/2023     08/16/2023    CHOL 160 08/16/2023    TRIG 90 08/16/2023    HDL 36 (L) 08/16/2023    ALT 55 (H) 08/16/2023    AST 41 08/16/2023     08/16/2023    K 3.3 (L) 08/16/2023     08/16/2023    CREATININE 0.88 08/16/2023    BUN 16 08/16/2023    CO2 29 08/16/2023    TSH 1.510 08/16/2023    PSA 0.82 08/16/2023    HGBA1C 5.7 (H) 08/16/2023       Diagnostic Studies: No relevant studies.    EKG:   Results for orders placed or performed in visit on 02/24/14   IN OFFICE EKG 12-LEAD (to [place])    Collection Time: 02/24/14  3:48 PM    Narrative    Test Reason : 785.1  Vent. Rate : 065 BPM     Atrial Rate : 065 BPM     P-R Int : 174 ms          QRS Dur : 092 ms      QT Int : 422 ms       P-R-T Axes : 043 043 005 degrees     QTc Int : 438 ms    Sinus rhythm with occasional Premature ventricular complexes  Otherwise normal ECG  When compared with ECG of 03-FEB-2014 14:41,  Premature ventricular complexes are now Present  Confirmed by Selvin Gonzalez MD ([id number]) on 2/26/2014 5:47:01 PM    Referred By: MD MERLY KIRK           Confirmed By:Selvin Gonzalez MD       ASSESSMENT/PLAN:           Pre-op Assessment    I have reviewed the Patient Summary Reports.     I have reviewed the Nursing Notes. I have  reviewed the NPO Status.   I have reviewed the Medications.     Review of Systems  Anesthesia Hx:  History of prior surgery of interest to airway management or planning: Denies Family Hx of Anesthesia complications.   Denies Personal Hx of Anesthesia complications.   Cardiovascular:   Hypertension    Musculoskeletal:   Arthritis     Psych:   Psychiatric History anxiety          Physical Exam  General: Well nourished, Cooperative, Alert and Oriented    Airway:  Mallampati: III / II  Mouth Opening: Normal  TM Distance: Normal  Tongue: Normal  Neck ROM: Normal ROM    Dental:  Intact        Anesthesia Plan  Type of Anesthesia, risks & benefits discussed:    Anesthesia Type: Gen Natural Airway  Intra-op Monitoring Plan: Standard ASA Monitors  Post Op Pain Control Plan: multimodal analgesia and IV/PO Opioids PRN  Induction:  IV  Informed Consent: Informed consent signed with the Patient and all parties understand the risks and agree with anesthesia plan.  All questions answered.   ASA Score: 2  Day of Surgery Review of History & Physical: H&P Update referred to the surgeon/provider.    Ready For Surgery From Anesthesia Perspective.     .

## 2023-08-18 NOTE — PLAN OF CARE
Admission process complete. Patient ready for procedure. Plan of care reviewed with patient. VSS. NPO status maintained. Call light in reach. Bed locked and in lowest position.

## 2023-08-19 NOTE — ANESTHESIA POSTPROCEDURE EVALUATION
Anesthesia Post Evaluation    Patient: Paul Espinoza    Procedure(s) Performed: Procedure(s) (LRB):  COLONOSCOPY (N/A)    Final Anesthesia Type: general      Patient location during evaluation: GI PACU  Patient participation: Yes- Able to Participate  Level of consciousness: awake and alert, oriented and awake  Post-procedure vital signs: reviewed and stable  Pain management: adequate  Airway patency: patent  SARAI mitigation strategies: Multimodal analgesia  PONV status at discharge: No PONV  Anesthetic complications: no      Cardiovascular status: blood pressure returned to baseline and hemodynamically stable  Respiratory status: unassisted and spontaneous ventilation  Hydration status: euvolemic  Follow-up not needed.          Vitals Value Taken Time   /65 08/18/23 1200   Temp 36.8 °C (98.2 °F) 08/18/23 1130   Pulse 86 08/18/23 1200   Resp 20 08/18/23 1200   SpO2 98 % 08/18/23 1200         Event Time   Out of Recovery 12:00:00         Pain/Cristóbal Score: Cristóbal Score: 10 (8/18/2023 12:00 PM)

## 2023-08-21 ENCOUNTER — OFFICE VISIT (OUTPATIENT)
Dept: INTERNAL MEDICINE | Facility: CLINIC | Age: 43
End: 2023-08-21
Payer: COMMERCIAL

## 2023-08-21 VITALS
HEIGHT: 73 IN | OXYGEN SATURATION: 97 % | HEART RATE: 59 BPM | DIASTOLIC BLOOD PRESSURE: 68 MMHG | SYSTOLIC BLOOD PRESSURE: 110 MMHG | BODY MASS INDEX: 37.93 KG/M2 | WEIGHT: 286.19 LBS

## 2023-08-21 DIAGNOSIS — E88.819 INSULIN RESISTANCE: ICD-10-CM

## 2023-08-21 DIAGNOSIS — R74.01 TRANSAMINASEMIA: ICD-10-CM

## 2023-08-21 DIAGNOSIS — Z00.00 PREVENTATIVE HEALTH CARE: Primary | ICD-10-CM

## 2023-08-21 DIAGNOSIS — I10 ESSENTIAL HYPERTENSION: ICD-10-CM

## 2023-08-21 DIAGNOSIS — E66.01 SEVERE OBESITY (BMI 35.0-39.9) WITH COMORBIDITY: ICD-10-CM

## 2023-08-21 DIAGNOSIS — F41.1 GAD (GENERALIZED ANXIETY DISORDER): ICD-10-CM

## 2023-08-21 DIAGNOSIS — E87.6 HYPOKALEMIA: ICD-10-CM

## 2023-08-21 DIAGNOSIS — R73.9 HYPERGLYCEMIA: ICD-10-CM

## 2023-08-21 DIAGNOSIS — E66.9 OBESITY, CLASS II, BMI 35-39.9, ISOLATED (SEE ACTUAL BMI): ICD-10-CM

## 2023-08-21 DIAGNOSIS — E78.6 LOW HDL (UNDER 40): ICD-10-CM

## 2023-08-21 PROCEDURE — 1160F PR REVIEW ALL MEDS BY PRESCRIBER/CLIN PHARMACIST DOCUMENTED: ICD-10-PCS | Mod: CPTII,S$GLB,, | Performed by: INTERNAL MEDICINE

## 2023-08-21 PROCEDURE — 3074F PR MOST RECENT SYSTOLIC BLOOD PRESSURE < 130 MM HG: ICD-10-PCS | Mod: CPTII,S$GLB,, | Performed by: INTERNAL MEDICINE

## 2023-08-21 PROCEDURE — 99999 PR PBB SHADOW E&M-EST. PATIENT-LVL IV: ICD-10-PCS | Mod: PBBFAC,,, | Performed by: INTERNAL MEDICINE

## 2023-08-21 PROCEDURE — 3074F SYST BP LT 130 MM HG: CPT | Mod: CPTII,S$GLB,, | Performed by: INTERNAL MEDICINE

## 2023-08-21 PROCEDURE — 99999 PR PBB SHADOW E&M-EST. PATIENT-LVL IV: CPT | Mod: PBBFAC,,, | Performed by: INTERNAL MEDICINE

## 2023-08-21 PROCEDURE — 99396 PR PREVENTIVE VISIT,EST,40-64: ICD-10-PCS | Mod: S$GLB,,, | Performed by: INTERNAL MEDICINE

## 2023-08-21 PROCEDURE — 3078F DIAST BP <80 MM HG: CPT | Mod: CPTII,S$GLB,, | Performed by: INTERNAL MEDICINE

## 2023-08-21 PROCEDURE — 1159F PR MEDICATION LIST DOCUMENTED IN MEDICAL RECORD: ICD-10-PCS | Mod: CPTII,S$GLB,, | Performed by: INTERNAL MEDICINE

## 2023-08-21 PROCEDURE — 3008F PR BODY MASS INDEX (BMI) DOCUMENTED: ICD-10-PCS | Mod: CPTII,S$GLB,, | Performed by: INTERNAL MEDICINE

## 2023-08-21 PROCEDURE — 3044F HG A1C LEVEL LT 7.0%: CPT | Mod: CPTII,S$GLB,, | Performed by: INTERNAL MEDICINE

## 2023-08-21 PROCEDURE — 3078F PR MOST RECENT DIASTOLIC BLOOD PRESSURE < 80 MM HG: ICD-10-PCS | Mod: CPTII,S$GLB,, | Performed by: INTERNAL MEDICINE

## 2023-08-21 PROCEDURE — 1160F RVW MEDS BY RX/DR IN RCRD: CPT | Mod: CPTII,S$GLB,, | Performed by: INTERNAL MEDICINE

## 2023-08-21 PROCEDURE — 3008F BODY MASS INDEX DOCD: CPT | Mod: CPTII,S$GLB,, | Performed by: INTERNAL MEDICINE

## 2023-08-21 PROCEDURE — 1159F MED LIST DOCD IN RCRD: CPT | Mod: CPTII,S$GLB,, | Performed by: INTERNAL MEDICINE

## 2023-08-21 PROCEDURE — 99396 PREV VISIT EST AGE 40-64: CPT | Mod: S$GLB,,, | Performed by: INTERNAL MEDICINE

## 2023-08-21 PROCEDURE — 3044F PR MOST RECENT HEMOGLOBIN A1C LEVEL <7.0%: ICD-10-PCS | Mod: CPTII,S$GLB,, | Performed by: INTERNAL MEDICINE

## 2023-08-21 RX ORDER — TRIAMTERENE/HYDROCHLOROTHIAZID 37.5-25 MG
1 TABLET ORAL DAILY
Qty: 90 TABLET | Refills: 3 | Status: SHIPPED | OUTPATIENT
Start: 2023-08-21

## 2023-08-21 RX ORDER — NEOMYCIN SULFATE, POLYMYXIN B SULFATE AND HYDROCORTISONE 10; 3.5; 1 MG/ML; MG/ML; [USP'U]/ML
4 SUSPENSION/ DROPS AURICULAR (OTIC) 4 TIMES DAILY
COMMUNITY
Start: 2023-02-24 | End: 2023-08-21

## 2023-08-21 RX ORDER — ESCITALOPRAM OXALATE 20 MG/1
20 TABLET ORAL DAILY
Qty: 90 TABLET | Refills: 3 | Status: SHIPPED | OUTPATIENT
Start: 2023-08-21

## 2023-08-21 RX ORDER — PREDNISONE 20 MG/1
40 TABLET ORAL
COMMUNITY
Start: 2023-02-24 | End: 2023-08-21

## 2023-08-21 RX ORDER — AMOXICILLIN 875 MG/1
875 TABLET, FILM COATED ORAL 2 TIMES DAILY
COMMUNITY
Start: 2023-02-24 | End: 2023-08-21

## 2023-08-21 RX ORDER — SOD SULF/POT CHLORIDE/MAG SULF 1.479 G
TABLET ORAL
COMMUNITY
Start: 2023-07-27 | End: 2023-08-21

## 2023-08-21 NOTE — PROGRESS NOTES
Subjective     Patient ID: Paul Espinoza is a 43 y.o. male.    Chief Complaint: Annual Exam    HPI 43-year-old male presents to clinic today for annual physical exam.  Patient had success 2 years ago with intermittent fasting he is thinking about resuming that.  Liver enzymes have been slightly elevated with his weight gain over the last few years.  Anxiety currently controlled.  Review of Systems  Otherwise negative     Objective     Physical Exam  General: Well-appearing, well-nourished.  No distress  HEENT: conjunctivae are normal.  Pupils are equal and reative to light.  TM's are clear and intact bilaterally.  Hearing is grossly normal.  Nasopharynx is clear.  Oropharynx is clear.  Neck: Supple.  No thyroid megaly.  No bruits.  Lymph: No cervical or supraclavicular adenopathy.  Heart: Regular rate and rhythm, without murmur, rub or gallop.  Lungs: Clear to auscultation; respiratory effort normal.  Abdomen: Soft, nontender, nondistended.  Normoactive bowel sounds.  No hepatomegaly.  No masses.  Extremities: Good distal pulses.  No edema.  Psych: Oriented to time person place.  Judgment and insight seem unimpaired.  Mood and affect are appropriate.     Assessment and Plan     1. Preventative health care    2. CHEMA (generalized anxiety disorder)  -     EScitalopram oxalate (LEXAPRO) 20 MG tablet; Take 1 tablet (20 mg total) by mouth once daily.  Dispense: 90 tablet; Refill: 3    3. Essential hypertension  -     Comprehensive Metabolic Panel; Standing; Expected date: 08/21/2023  -     Hemoglobin A1C; Standing; Expected date: 08/21/2023  -     Lipid Panel; Standing; Expected date: 08/21/2023  -     triamterene-hydrochlorothiazide 37.5-25 mg (MAXZIDE-25) 37.5-25 mg per tablet; Take 1 tablet by mouth once daily.  Dispense: 90 tablet; Refill: 3    4. Obesity, Class II, BMI 35-39.9, isolated (see actual BMI)    5. Transaminasemia  -     US Abdomen Complete; Future; Expected date: 08/21/2023    6. Low HDL (under 40)    7.  Insulin resistance  -     Hemoglobin A1C; Standing; Expected date: 08/21/2023    8. Severe obesity (BMI 35.0-39.9) with comorbidity    9. Hypokalemia    10. Hyperglycemia  -     Hemoglobin A1C; Standing; Expected date: 08/21/2023        Paul was seen today for annual exam.    Diagnoses and all orders for this visit:    Preventative health care  Healthcare maintenance performed, reviewed, updated and counseled today.  CHEMA (generalized anxiety disorder)  -     EScitalopram oxalate (LEXAPRO) 20 MG tablet; Take 1 tablet (20 mg total) by mouth once daily.  Controlled.  Continue current medical regimen.  Prescription refills addressed.  Followup advised. See after visit summary.  Essential hypertension  -     Comprehensive Metabolic Panel; Standing  -     Hemoglobin A1C; Standing  -     Lipid Panel; Standing  -     triamterene-hydrochlorothiazide 37.5-25 mg (MAXZIDE-25) 37.5-25 mg per tablet; Take 1 tablet by mouth once daily.  Controlled.  Continue current medical regimen.  Prescription refills addressed.  Followup advised. See after visit summary.  Obesity, Class II, BMI 35-39.9, isolated (see actual BMI)  Counseled on dietary recommendations and exercise to promote weight loss she  Transaminasemia  -     US Abdomen Complete; Future    Low HDL (under 40)  Counseling provided  Insulin resistance  -     Hemoglobin A1C; Standing    Severe obesity (BMI 35.0-39.9) with comorbidity  Counseling provided  Hypokalemia  Patient feels that this is attributed to apple cider vinegar which he is going to modify increase potassium containing foods in diet  Hyperglycemia  -     Hemoglobin A1C; Standing              Follow up in about 6 months (around 2/21/2024) for HTN cmp lipids, HEMOGLOBIN A1C.

## 2023-08-30 ENCOUNTER — HOSPITAL ENCOUNTER (OUTPATIENT)
Dept: RADIOLOGY | Facility: HOSPITAL | Age: 43
Discharge: HOME | End: 2023-08-30
Attending: INTERNAL MEDICINE
Payer: COMMERCIAL

## 2023-08-30 DIAGNOSIS — R74.01 TRANSAMINASEMIA: ICD-10-CM

## 2023-08-30 PROCEDURE — 76700 US ABDOMEN COMPLETE: ICD-10-PCS | Mod: 26,,, | Performed by: RADIOLOGY

## 2023-08-30 PROCEDURE — 76700 US EXAM ABDOM COMPLETE: CPT | Mod: 26,,, | Performed by: RADIOLOGY

## 2023-08-30 PROCEDURE — 76700 US EXAM ABDOM COMPLETE: CPT | Mod: TC,PO

## 2023-09-25 ENCOUNTER — TELEPHONE (OUTPATIENT)
Dept: INTERNAL MEDICINE | Facility: CLINIC | Age: 43
End: 2023-09-25
Payer: COMMERCIAL

## 2023-09-25 DIAGNOSIS — R74.01 TRANSAMINASEMIA: Primary | ICD-10-CM

## 2023-09-25 NOTE — PROGRESS NOTES
Based on ultrasound findings and your liver enzymes, I would like for you to see hepatology just to exclude any other potential causes other than fatty liver.  I will have our referral coordinator contact you to set up this referral.

## 2023-11-23 DIAGNOSIS — I10 ESSENTIAL HYPERTENSION: ICD-10-CM

## 2023-11-23 RX ORDER — VERAPAMIL HYDROCHLORIDE 240 MG/1
TABLET, FILM COATED, EXTENDED RELEASE ORAL
Qty: 90 TABLET | Refills: 2 | Status: SHIPPED | OUTPATIENT
Start: 2023-11-23

## 2023-11-23 NOTE — TELEPHONE ENCOUNTER
Refill Decision Note   Paul Espinoza  is requesting a refill authorization.  Brief Assessment and Rationale for Refill:  Approve     Medication Therapy Plan:       Medication Reconciliation Completed: No   Comments:     No Care Gaps recommended.     Note composed:9:29 AM 11/23/2023

## 2023-11-23 NOTE — TELEPHONE ENCOUNTER
No care due was identified.  Health [facility] Embedded Care Due Messages. Reference number: [id number].   11/23/2023 12:16:46 AM CST

## 2023-12-19 ENCOUNTER — OFFICE VISIT (OUTPATIENT)
Dept: HEPATOLOGY | Facility: CLINIC | Age: 43
End: 2023-12-19
Attending: INTERNAL MEDICINE
Payer: COMMERCIAL

## 2023-12-19 VITALS
DIASTOLIC BLOOD PRESSURE: 78 MMHG | BODY MASS INDEX: 36.64 KG/M2 | OXYGEN SATURATION: 98 % | WEIGHT: 276.44 LBS | SYSTOLIC BLOOD PRESSURE: 125 MMHG | RESPIRATION RATE: 18 BRPM | HEART RATE: 68 BPM | HEIGHT: 73 IN | TEMPERATURE: 98 F

## 2023-12-19 DIAGNOSIS — R74.01 TRANSAMINASEMIA: ICD-10-CM

## 2023-12-19 DIAGNOSIS — R74.8 ELEVATED LIVER ENZYMES: Chronic | ICD-10-CM

## 2023-12-19 DIAGNOSIS — R74.8 ELEVATED LIVER ENZYMES: Primary | Chronic | ICD-10-CM

## 2023-12-19 PROCEDURE — 99999 PR PBB SHADOW E&M-EST. PATIENT-LVL IV: ICD-10-PCS | Mod: PBBFAC,,, | Performed by: INTERNAL MEDICINE

## 2023-12-19 PROCEDURE — 99999 PR PBB SHADOW E&M-EST. PATIENT-LVL IV: CPT | Mod: PBBFAC,,, | Performed by: INTERNAL MEDICINE

## 2023-12-19 PROCEDURE — 91200 LIVER ELASTOGRAPHY: CPT | Mod: S$GLB,,, | Performed by: INTERNAL MEDICINE

## 2023-12-19 PROCEDURE — 3078F PR MOST RECENT DIASTOLIC BLOOD PRESSURE < 80 MM HG: ICD-10-PCS | Mod: CPTII,S$GLB,, | Performed by: INTERNAL MEDICINE

## 2023-12-19 PROCEDURE — 1159F PR MEDICATION LIST DOCUMENTED IN MEDICAL RECORD: ICD-10-PCS | Mod: CPTII,S$GLB,, | Performed by: INTERNAL MEDICINE

## 2023-12-19 PROCEDURE — 3078F DIAST BP <80 MM HG: CPT | Mod: CPTII,S$GLB,, | Performed by: INTERNAL MEDICINE

## 2023-12-19 PROCEDURE — 3074F PR MOST RECENT SYSTOLIC BLOOD PRESSURE < 130 MM HG: ICD-10-PCS | Mod: CPTII,S$GLB,, | Performed by: INTERNAL MEDICINE

## 2023-12-19 PROCEDURE — 3074F SYST BP LT 130 MM HG: CPT | Mod: CPTII,S$GLB,, | Performed by: INTERNAL MEDICINE

## 2023-12-19 PROCEDURE — 91200 PR LIVER ELASTOGRAPHY W/OUT IMAG W/INTERP & REPORT: ICD-10-PCS | Mod: S$GLB,,, | Performed by: INTERNAL MEDICINE

## 2023-12-19 PROCEDURE — 3008F PR BODY MASS INDEX (BMI) DOCUMENTED: ICD-10-PCS | Mod: CPTII,S$GLB,, | Performed by: INTERNAL MEDICINE

## 2023-12-19 PROCEDURE — 3044F PR MOST RECENT HEMOGLOBIN A1C LEVEL <7.0%: ICD-10-PCS | Mod: CPTII,S$GLB,, | Performed by: INTERNAL MEDICINE

## 2023-12-19 PROCEDURE — 99214 PR OFFICE/OUTPT VISIT, EST, LEVL IV, 30-39 MIN: ICD-10-PCS | Mod: S$GLB,,, | Performed by: INTERNAL MEDICINE

## 2023-12-19 PROCEDURE — 1159F MED LIST DOCD IN RCRD: CPT | Mod: CPTII,S$GLB,, | Performed by: INTERNAL MEDICINE

## 2023-12-19 PROCEDURE — 99214 OFFICE O/P EST MOD 30 MIN: CPT | Mod: S$GLB,,, | Performed by: INTERNAL MEDICINE

## 2023-12-19 PROCEDURE — 3044F HG A1C LEVEL LT 7.0%: CPT | Mod: CPTII,S$GLB,, | Performed by: INTERNAL MEDICINE

## 2023-12-19 PROCEDURE — 3008F BODY MASS INDEX DOCD: CPT | Mod: CPTII,S$GLB,, | Performed by: INTERNAL MEDICINE

## 2023-12-19 NOTE — PROCEDURES
Fibroscan Procedure     Name: Paul Espinoza  Date of Procedure : 2023   :: Issa Horton MD  Diagnosis: NAFLD    Probe: M    Fibroscan readin.6 KPa    Fibrosis:F2     CAP readin dB/m    Steatosis: :S3

## 2023-12-19 NOTE — PROGRESS NOTES
HEPATOLOGY CONSULTATION    Referring Physician: Dr. Rosa Martinez  Current Corresponding Physician: Dr. Rosa Martinez    Reason for Consultation: Consultation for evaluation of Elevated Hepatic Enzymes    History of Present Illness: Paul Espinoza is a 43 y.o. malewho presents for evaluation of   Chief Complaint   Patient presents with    Elevated Hepatic Enzymes   Year old gentleman was referred for evaluation and management of mildly elevated liver enzymes.  He has risk factors for metabolic associated steatotic liver disease, namely insulin resistance and severe obesity with a body mass index of 36.5.  His alcohol consumption is minimal.  Viral hepatitis testing is negative.  He has no symptoms of chronic liver disease.  His liver synthetic function is normal.  He was started on an intermittent fasting diet and is already lost some weight.  An ultrasound did not really suggests fatty infiltration.    Past Medical History:   Diagnosis Date    Allergy     Anxiety     Hydrocele, unspecified     post hydrocelectomy    Transaminasemia      Outpatient Encounter Medications as of 12/19/2023   Medication Sig Dispense Refill    EScitalopram oxalate (LEXAPRO) 20 MG tablet Take 1 tablet (20 mg total) by mouth once daily. 90 tablet 3    LORazepam (ATIVAN) 0.5 MG tablet Take 1 tablet (0.5 mg total) by mouth daily as needed for Anxiety. 30 tablet 0    oxybutynin (DITROPAN XL) 5 MG TR24 Take 1 tablet (5 mg total) by mouth once daily. 90 tablet 1    triamterene-hydrochlorothiazide 37.5-25 mg (MAXZIDE-25) 37.5-25 mg per tablet Take 1 tablet by mouth once daily. 90 tablet 3    verapamiL (CALAN-SR) 240 MG CR tablet TAKE 1 TABLET (240 MG TOTAL) BY MOUTH DAILY 90 tablet 2    [DISCONTINUED] verapamiL (CALAN-SR) 240 MG CR tablet TAKE 1 TABLET (240 MG TOTAL) BY MOUTH ONCE DAILY. 90 tablet 3     No facility-administered encounter medications on file as of 12/19/2023.     Review of patient's allergies indicates:   Allergen Reactions     Norvasc [amlodipine] Other (See Comments)     Erectile dysfunction     Family History   Problem Relation Age of Onset    Fibromyalgia Mother     Arthritis Mother     Heart disease Paternal Grandmother        Social History     Socioeconomic History    Marital status:    Tobacco Use    Smoking status: Never    Smokeless tobacco: Never   Substance and Sexual Activity    Alcohol use: Yes     Comment: occasional    Drug use: No    Sexual activity: Yes     Comment:      Social Determinants of Health     Financial Resource Strain: Low Risk  (8/21/2023)    Overall Financial Resource Strain (CARDIA)     Difficulty of Paying Living Expenses: Not very hard   Food Insecurity: Patient Declined (8/21/2023)    Hunger Vital Sign     Worried About Running Out of Food in the Last Year: Patient declined     Ran Out of Food in the Last Year: Patient declined   Transportation Needs: Patient Declined (8/21/2023)    PRAPARE - Transportation     Lack of Transportation (Medical): Patient declined     Lack of Transportation (Non-Medical): Patient declined   Physical Activity: Insufficiently Active (8/21/2023)    Exercise Vital Sign     Days of Exercise per Week: 3 days     Minutes of Exercise per Session: 30 min   Stress: No Stress Concern Present (8/21/2023)    [place] [employer] - Occupational Stress Questionnaire     Feeling of Stress : Only a little   Social Connections: Unknown (8/21/2023)    Social Connection and Isolation Panel [NHANES]     Frequency of Communication with Friends and Family: Once a week     Frequency of Social Gatherings with Friends and Family: Patient declined     Active Member of Clubs or Organizations: Yes     Attends Club or Organization Meetings: More than 4 times per year     Marital Status:    Housing Stability: Low Risk  (8/21/2023)    Housing Stability Vital Sign     Unable to Pay for Housing in the Last Year: No     Number of Places Lived in the Last Year: 1      Unstable Housing in the Last Year: No     Review of Systems   Constitutional:  Negative for activity change, appetite change, chills, fatigue and unexpected weight change.   HENT:  Negative for congestion, facial swelling and tinnitus.    Eyes:  Negative for visual disturbance.   Respiratory:  Negative for cough, shortness of breath and wheezing.    Cardiovascular:  Negative for chest pain and palpitations.   Gastrointestinal:  Negative for abdominal distention.   Genitourinary:  Negative for dysuria.   Musculoskeletal:  Negative for arthralgias, joint swelling and myalgias.   Neurological:  Negative for syncope and headaches.   Hematological:  Does not bruise/bleed easily.   Psychiatric/Behavioral:  Negative for confusion.      Vitals:    12/19/23 0849   BP: 125/78   Pulse: 68   Resp: 18   Temp: 98 °F (36.7 °C)       Physical Exam  Constitutional:       Appearance: He is well-developed.   Eyes:      General: No scleral icterus.  Cardiovascular:      Rate and Rhythm: Normal rate and regular rhythm.      Heart sounds: Normal heart sounds.   Pulmonary:      Effort: Pulmonary effort is normal. No respiratory distress.      Breath sounds: Normal breath sounds. No wheezing.   Abdominal:      General: Bowel sounds are normal. There is no distension.      Palpations: Abdomen is soft. There is no mass.      Tenderness: There is no abdominal tenderness. There is no rebound.   Musculoskeletal:         General: Normal range of motion.   Lymphadenopathy:      Cervical: No cervical adenopathy.   Skin:     General: Skin is warm and dry.   Neurological:      Mental Status: He is alert and oriented to person, place, and time.         Computed MELD 3.0 unavailable. Necessary lab results were not found in the last year.  Computed MELD-Na unavailable. Necessary lab results were not found in the last year.      Lab Results   Component Value Date    GLU 96 08/16/2023    BUN 16 08/16/2023    CREATININE 0.88 08/16/2023    CALCIUM 9.1  08/16/2023     08/16/2023    K 3.3 (L) 08/16/2023     08/16/2023    PROT 7.9 08/16/2023    CO2 29 08/16/2023    ANIONGAP 11 08/16/2023    WBC 4.24 08/16/2023    RBC 5.12 08/16/2023    HGB 14.7 08/16/2023    HCT 42.9 08/16/2023    MCV 84 08/16/2023    MCH 28.7 08/16/2023    MCHC 34.3 08/16/2023     Lab Results   Component Value Date    RDW 12.7 08/16/2023     08/16/2023    MPV 10.2 08/16/2023    GRAN 1.4 (L) 08/16/2023    GRAN 32.1 (L) 08/16/2023    LYMPH 2.1 08/16/2023    LYMPH 48.8 (H) 08/16/2023    MONO 0.5 08/16/2023    MONO 12.5 08/16/2023    EOSINOPHIL 5.4 08/16/2023    BASOPHIL 0.7 08/16/2023    EOS 0.2 08/16/2023    BASO 0.03 08/16/2023    CHOL 160 08/16/2023    TRIG 90 08/16/2023    HDL 36 (L) 08/16/2023    CHOLHDL 22.5 08/16/2023    TOTALCHOLEST 4.4 08/16/2023    ALBUMIN 4.4 08/16/2023    BILIDIR 0.2 07/01/2011    AST 41 08/16/2023    ALT 55 (H) 08/16/2023    ALKPHOS 54 08/16/2023    PSA 0.82 08/16/2023     Assessment and Plan:  Patient Active Problem List   Diagnosis    Social phobia    Essential hypertension    CHEMA (generalized anxiety disorder)    Obesity, Class II, BMI 35-39.9, isolated (see actual BMI)    Hip weakness    Decreased ROM of lumbar spine    Pain    Lumbar spondylosis    Low HDL (under 40)    Insulin resistance    Severe obesity (BMI 35.0-39.9) with comorbidity    Elevated liver enzymes     Impression:  MASLD with mildly elevated liver enzymes and a FibroScan suggests significant steatosis and stage 2/4 fibrosis.    Plan:  I explained to the patient that the only treatment we have for fatty liver disease is weight loss and I recommend diet modification and exercise.   He is already started to lose weight.  I will have him return to clinic in 1 year's time for continued clinical and biochemical surveillance.

## 2024-01-11 DIAGNOSIS — R61 HYPERHIDROSIS: ICD-10-CM

## 2024-01-11 RX ORDER — OXYBUTYNIN CHLORIDE 5 MG/1
5 TABLET, EXTENDED RELEASE ORAL
Qty: 90 TABLET | Refills: 2 | Status: SHIPPED | OUTPATIENT
Start: 2024-01-11

## 2024-01-11 NOTE — TELEPHONE ENCOUNTER
Refill Decision Note   Paul Alexis  is requesting a refill authorization.  Brief Assessment and Rationale for Refill:  Approve     Medication Therapy Plan:         Comments:     Note composed:9:29 AM 01/11/2024

## 2024-01-11 NOTE — TELEPHONE ENCOUNTER
No care due was identified.  Health [facility] Embedded Care Due Messages. Reference number: [id number].   1/11/2024 12:12:21 AM CST

## 2024-04-24 ENCOUNTER — OFFICE VISIT (OUTPATIENT)
Dept: INTERNAL MEDICINE | Facility: CLINIC | Age: 44
End: 2024-04-24
Payer: COMMERCIAL

## 2024-04-24 ENCOUNTER — LAB VISIT (OUTPATIENT)
Dept: LAB | Facility: HOSPITAL | Age: 44
End: 2024-04-24
Attending: INTERNAL MEDICINE
Payer: COMMERCIAL

## 2024-04-24 VITALS
HEIGHT: 73 IN | OXYGEN SATURATION: 99 % | BODY MASS INDEX: 35.18 KG/M2 | DIASTOLIC BLOOD PRESSURE: 70 MMHG | WEIGHT: 265.44 LBS | SYSTOLIC BLOOD PRESSURE: 110 MMHG | HEART RATE: 75 BPM

## 2024-04-24 DIAGNOSIS — Z00.00 PREVENTATIVE HEALTH CARE: ICD-10-CM

## 2024-04-24 DIAGNOSIS — R73.9 HYPERGLYCEMIA: ICD-10-CM

## 2024-04-24 DIAGNOSIS — I10 ESSENTIAL HYPERTENSION: ICD-10-CM

## 2024-04-24 DIAGNOSIS — E66.01 SEVERE OBESITY (BMI 35.0-39.9) WITH COMORBIDITY: ICD-10-CM

## 2024-04-24 DIAGNOSIS — F41.1 GAD (GENERALIZED ANXIETY DISORDER): ICD-10-CM

## 2024-04-24 DIAGNOSIS — R73.9 HYPERGLYCEMIA: Primary | ICD-10-CM

## 2024-04-24 DIAGNOSIS — R74.8 ELEVATED LIVER ENZYMES: Chronic | ICD-10-CM

## 2024-04-24 DIAGNOSIS — K76.0 FATTY LIVER: ICD-10-CM

## 2024-04-24 LAB
ALBUMIN SERPL BCP-MCNC: 4.3 G/DL (ref 3.5–5.2)
ALP SERPL-CCNC: 51 U/L (ref 55–135)
ALT SERPL W/O P-5'-P-CCNC: 61 U/L (ref 10–44)
ANION GAP SERPL CALC-SCNC: 10 MMOL/L (ref 8–16)
AST SERPL-CCNC: 32 U/L (ref 10–40)
BILIRUB SERPL-MCNC: 0.4 MG/DL (ref 0.1–1)
BUN SERPL-MCNC: 11 MG/DL (ref 6–20)
CALCIUM SERPL-MCNC: 10.2 MG/DL (ref 8.7–10.5)
CHLORIDE SERPL-SCNC: 101 MMOL/L (ref 95–110)
CHOLEST SERPL-MCNC: 187 MG/DL (ref 120–199)
CHOLEST/HDLC SERPL: 4.6 {RATIO} (ref 2–5)
CO2 SERPL-SCNC: 29 MMOL/L (ref 23–29)
CREAT SERPL-MCNC: 0.9 MG/DL (ref 0.5–1.4)
EST. GFR  (NO RACE VARIABLE): >60 ML/MIN/1.73 M^2
ESTIMATED AVG GLUCOSE: 114 MG/DL (ref 68–131)
GLUCOSE SERPL-MCNC: 77 MG/DL (ref 70–110)
HBA1C MFR BLD: 5.6 % (ref 4–5.6)
HDLC SERPL-MCNC: 41 MG/DL (ref 40–75)
HDLC SERPL: 21.9 % (ref 20–50)
LDLC SERPL CALC-MCNC: 129 MG/DL (ref 63–159)
NONHDLC SERPL-MCNC: 146 MG/DL
POTASSIUM SERPL-SCNC: 3.7 MMOL/L (ref 3.5–5.1)
PROT SERPL-MCNC: 7.8 G/DL (ref 6–8.4)
SODIUM SERPL-SCNC: 140 MMOL/L (ref 136–145)
TRIGL SERPL-MCNC: 85 MG/DL (ref 30–150)

## 2024-04-24 PROCEDURE — 1160F RVW MEDS BY RX/DR IN RCRD: CPT | Mod: CPTII,S$GLB,, | Performed by: INTERNAL MEDICINE

## 2024-04-24 PROCEDURE — 36415 COLL VENOUS BLD VENIPUNCTURE: CPT | Mod: PO | Performed by: INTERNAL MEDICINE

## 2024-04-24 PROCEDURE — 3078F DIAST BP <80 MM HG: CPT | Mod: CPTII,S$GLB,, | Performed by: INTERNAL MEDICINE

## 2024-04-24 PROCEDURE — 80053 COMPREHEN METABOLIC PANEL: CPT | Performed by: INTERNAL MEDICINE

## 2024-04-24 PROCEDURE — 3044F HG A1C LEVEL LT 7.0%: CPT | Mod: CPTII,S$GLB,, | Performed by: INTERNAL MEDICINE

## 2024-04-24 PROCEDURE — 80061 LIPID PANEL: CPT | Performed by: INTERNAL MEDICINE

## 2024-04-24 PROCEDURE — 3008F BODY MASS INDEX DOCD: CPT | Mod: CPTII,S$GLB,, | Performed by: INTERNAL MEDICINE

## 2024-04-24 PROCEDURE — 1159F MED LIST DOCD IN RCRD: CPT | Mod: CPTII,S$GLB,, | Performed by: INTERNAL MEDICINE

## 2024-04-24 PROCEDURE — 99214 OFFICE O/P EST MOD 30 MIN: CPT | Mod: S$GLB,,, | Performed by: INTERNAL MEDICINE

## 2024-04-24 PROCEDURE — 83036 HEMOGLOBIN GLYCOSYLATED A1C: CPT | Performed by: INTERNAL MEDICINE

## 2024-04-24 PROCEDURE — 99999 PR PBB SHADOW E&M-EST. PATIENT-LVL IV: CPT | Mod: PBBFAC,,, | Performed by: INTERNAL MEDICINE

## 2024-04-24 PROCEDURE — 3074F SYST BP LT 130 MM HG: CPT | Mod: CPTII,S$GLB,, | Performed by: INTERNAL MEDICINE

## 2024-04-29 NOTE — PROGRESS NOTES
Patient ID: Paul Espinoza is a 44 y.o. male    Chief Complaint: Hypertension and Follow-up    History of Present Illness  The patient is a 44-year-old male who presents today for hypertension follow-up.    The patient recently consulted a liver specialist for a diagnosis of hepatic steatosis. The specialist recommended weight loss as a means to manage his condition. The patient has implemented intermittent fasting and reduced his sugar intake in an attempt to lose weight. He has also initiated a gym regimen with his son.    ROS: Otherwise Negative     Physical Exam  Vital Signs  The patient's blood pressure is 110/70.  General: Well-appearing, well-nourished.  No distress  HEENT: conjunctivae are normal.  Pupils are equal and reative to light.  TM's are clear and intact bilaterally.  Hearing is grossly normal.  Nasopharynx is clear.  Oropharynx is clear.  Neck: Supple.  No thyroid megaly.  No bruits.  Lymph: No cervical or supraclavicular adenopathy.  Heart: Regular rate and rhythm, without murmur, rub or gallop.  Lungs: Clear to auscultation; respiratory effort normal.  Abdomen: Soft, nontender, nondistended.  Normoactive bowel sounds.  No hepatomegaly.  No masses.  Extremities: Good distal pulses.  No edema.  Psych: Oriented to time person place.  Judgment and insight seem unimpaired.  Mood and affect are appropriate.  Results  Laboratory Studies  Liver enzymes showed significant fatty liver, stage 2 out of 4 with some scarring and fibrosis.    Assessment & Plan  1. Hypertension.  The patient's blood pressure is well-regulated. A comprehensive blood panel, including CMP, lipid profile, and hemoglobin A1c, will be conducted today.    Follow-up  The patient is scheduled for a physical examination in 5 months.      Follow up in about 5 months (around 9/24/2024) for physical/diabetes Cbc,tsh, cmp lipid, hba1c+/- PSA.    Paul was seen today for hypertension and follow-up.    Diagnoses and all orders for this  visit:    Hyperglycemia  -     Hemoglobin A1C; Standing  Recommended weight loss avoid sugar and starches continued surveillance  Essential hypertension  -     Comprehensive Metabolic Panel; Standing  -     Lipid Panel; Standing  Controlled.  Continue current medical regimen.  Prescription refills addressed.  Followup advised. See after visit summary.  Elevated liver enzymes  Likely due to fatty liver discussed and counseled on weight loss continued surveillance  Fatty liver    Severe obesity (BMI 35.0-39.9) with comorbidity  Counseled on healthy weight management and lifestyle modification to promote weight loss  CHEMA (generalized anxiety disorder)    Preventative health care  -     Hemoglobin A1C; Standing  -     CBC Auto Differential; Future  -     Comprehensive Metabolic Panel; Future  -     TSH; Future  -     Lipid Panel; Future  -     PSA, Screening; Future         This note was generated with the assistance of ambient listening technology. Verbal consent was obtained by the patient and accompanying visitor(s) for the recording of patient appointment to facilitate this note. I attest to having reviewed and edited the generated note for accuracy, though some syntax or spelling errors may persist. Please contact the author of this note for any clarification.

## 2024-08-10 DIAGNOSIS — R61 HYPERHIDROSIS: ICD-10-CM

## 2024-08-10 NOTE — TELEPHONE ENCOUNTER
No care due was identified.  [facility] Embedded Care Due Messages. Reference number: [id number].   8/10/2024 7:07:32 AM CDT

## 2024-08-11 RX ORDER — OXYBUTYNIN CHLORIDE 5 MG/1
5 TABLET, EXTENDED RELEASE ORAL
Qty: 90 TABLET | Refills: 2 | Status: SHIPPED | OUTPATIENT
Start: 2024-08-11

## 2024-08-11 NOTE — TELEPHONE ENCOUNTER
Refill Decision Note   Paul Alexis  is requesting a refill authorization.  Brief Assessment and Rationale for Refill:  Approve     Medication Therapy Plan:         Comments:     Note composed:12:38 AM 08/11/2024

## 2024-08-16 DIAGNOSIS — I10 ESSENTIAL HYPERTENSION: ICD-10-CM

## 2024-08-16 RX ORDER — VERAPAMIL HYDROCHLORIDE 240 MG/1
240 TABLET, FILM COATED, EXTENDED RELEASE ORAL
Qty: 90 TABLET | Refills: 2 | Status: SHIPPED | OUTPATIENT
Start: 2024-08-16

## 2024-08-16 RX ORDER — TRIAMTERENE/HYDROCHLOROTHIAZID 37.5-25 MG
1 TABLET ORAL
Qty: 90 TABLET | Refills: 2 | Status: SHIPPED | OUTPATIENT
Start: 2024-08-16

## 2024-08-16 NOTE — TELEPHONE ENCOUNTER
Refill Decision Note   Paul Madsenzile  is requesting a refill authorization.  Brief Assessment and Rationale for Refill:  Approve     Medication Therapy Plan:         Comments:     Note composed:9:34 AM 08/16/2024

## 2024-08-16 NOTE — TELEPHONE ENCOUNTER
No care due was identified.  Health [facility] Embedded Care Due Messages. Reference number: [id number].   8/16/2024 12:04:54 AM CDT

## 2024-09-24 ENCOUNTER — LAB VISIT (OUTPATIENT)
Dept: LAB | Facility: HOSPITAL | Age: 44
End: 2024-09-24
Attending: INTERNAL MEDICINE
Payer: COMMERCIAL

## 2024-09-24 DIAGNOSIS — Z00.00 PREVENTATIVE HEALTH CARE: ICD-10-CM

## 2024-09-24 LAB
ALBUMIN SERPL BCP-MCNC: 4.3 G/DL (ref 3.5–5.2)
ALP SERPL-CCNC: 57 U/L (ref 38–126)
ALT SERPL W/O P-5'-P-CCNC: 51 U/L (ref 10–44)
ANION GAP SERPL CALC-SCNC: 8 MMOL/L (ref 8–16)
AST SERPL-CCNC: 35 U/L (ref 15–46)
BASOPHILS # BLD AUTO: 0.02 K/UL (ref 0–0.2)
BASOPHILS NFR BLD: 0.5 % (ref 0–1.9)
BILIRUB SERPL-MCNC: 0.3 MG/DL (ref 0.1–1)
CALCIUM SERPL-MCNC: 9.1 MG/DL (ref 8.7–10.5)
CHLORIDE SERPL-SCNC: 103 MMOL/L (ref 95–110)
CHOLEST SERPL-MCNC: 177 MG/DL (ref 120–199)
CHOLEST/HDLC SERPL: 4.9 {RATIO} (ref 2–5)
CO2 SERPL-SCNC: 28 MMOL/L (ref 23–29)
COMPLEXED PSA SERPL-MCNC: 1.8 NG/ML (ref 0–4)
CREAT SERPL-MCNC: 1.24 MG/DL (ref 0.5–1.4)
DIFFERENTIAL METHOD BLD: ABNORMAL
EOSINOPHIL # BLD AUTO: 0.2 K/UL (ref 0–0.5)
EOSINOPHIL NFR BLD: 5.4 % (ref 0–8)
ERYTHROCYTE [DISTWIDTH] IN BLOOD BY AUTOMATED COUNT: 12.6 % (ref 11.5–14.5)
EST. GFR  (NO RACE VARIABLE): >60 ML/MIN/1.73 M^2
ESTIMATED AVG GLUCOSE: 111 MG/DL (ref 68–131)
GLUCOSE SERPL-MCNC: 100 MG/DL (ref 70–110)
HBA1C MFR BLD: 5.5 % (ref 4–5.6)
HCT VFR BLD AUTO: 40.2 % (ref 40–54)
HDLC SERPL-MCNC: 36 MG/DL (ref 40–75)
HDLC SERPL: 20.3 % (ref 20–50)
HGB BLD-MCNC: 13.6 G/DL (ref 14–18)
IMM GRANULOCYTES # BLD AUTO: 0.02 K/UL (ref 0–0.04)
IMM GRANULOCYTES NFR BLD AUTO: 0.5 % (ref 0–0.5)
LDLC SERPL CALC-MCNC: 122.6 MG/DL (ref 63–159)
LYMPHOCYTES # BLD AUTO: 2.1 K/UL (ref 1–4.8)
LYMPHOCYTES NFR BLD: 56.1 % (ref 18–48)
MCH RBC QN AUTO: 28.5 PG (ref 27–31)
MCHC RBC AUTO-ENTMCNC: 33.8 G/DL (ref 32–36)
MCV RBC AUTO: 84 FL (ref 82–98)
MONOCYTES # BLD AUTO: 0.5 K/UL (ref 0.3–1)
MONOCYTES NFR BLD: 12.8 % (ref 4–15)
NEUTROPHILS # BLD AUTO: 0.9 K/UL (ref 1.8–7.7)
NEUTROPHILS NFR BLD: 24.7 % (ref 38–73)
NONHDLC SERPL-MCNC: 141 MG/DL
NRBC BLD-RTO: 0 /100 WBC
PLATELET # BLD AUTO: 259 K/UL (ref 150–450)
PLATELET BLD QL SMEAR: ABNORMAL
PMV BLD AUTO: 10.1 FL (ref 9.2–12.9)
POTASSIUM SERPL-SCNC: 4.2 MMOL/L (ref 3.5–5.1)
PROT SERPL-MCNC: 7.2 G/DL (ref 6–8.4)
RBC # BLD AUTO: 4.78 M/UL (ref 4.6–6.2)
SODIUM SERPL-SCNC: 139 MMOL/L (ref 136–145)
TRIGL SERPL-MCNC: 92 MG/DL (ref 30–150)
TSH SERPL DL<=0.005 MIU/L-ACNC: 1.39 UIU/ML (ref 0.4–4)
UUN UR-MCNC: 22 MG/DL (ref 2–20)
WBC # BLD AUTO: 3.67 K/UL (ref 3.9–12.7)

## 2024-09-24 PROCEDURE — 84153 ASSAY OF PSA TOTAL: CPT | Performed by: INTERNAL MEDICINE

## 2024-09-24 PROCEDURE — 85025 COMPLETE CBC W/AUTO DIFF WBC: CPT | Mod: PN | Performed by: INTERNAL MEDICINE

## 2024-09-24 PROCEDURE — 80061 LIPID PANEL: CPT | Performed by: INTERNAL MEDICINE

## 2024-09-24 PROCEDURE — 84443 ASSAY THYROID STIM HORMONE: CPT | Mod: PN | Performed by: INTERNAL MEDICINE

## 2024-09-24 PROCEDURE — 80053 COMPREHEN METABOLIC PANEL: CPT | Mod: PN | Performed by: INTERNAL MEDICINE

## 2024-09-24 PROCEDURE — 83036 HEMOGLOBIN GLYCOSYLATED A1C: CPT | Performed by: INTERNAL MEDICINE

## 2024-09-24 PROCEDURE — 36415 COLL VENOUS BLD VENIPUNCTURE: CPT | Mod: PN | Performed by: INTERNAL MEDICINE

## 2024-09-27 ENCOUNTER — OFFICE VISIT (OUTPATIENT)
Dept: INTERNAL MEDICINE | Facility: CLINIC | Age: 44
End: 2024-09-27
Payer: COMMERCIAL

## 2024-09-27 VITALS
HEART RATE: 65 BPM | OXYGEN SATURATION: 97 % | DIASTOLIC BLOOD PRESSURE: 74 MMHG | SYSTOLIC BLOOD PRESSURE: 116 MMHG | HEIGHT: 73 IN | BODY MASS INDEX: 35.06 KG/M2 | WEIGHT: 264.56 LBS

## 2024-09-27 DIAGNOSIS — K76.0 FATTY LIVER: ICD-10-CM

## 2024-09-27 DIAGNOSIS — F41.1 GAD (GENERALIZED ANXIETY DISORDER): ICD-10-CM

## 2024-09-27 DIAGNOSIS — F40.10 SOCIAL PHOBIA: ICD-10-CM

## 2024-09-27 DIAGNOSIS — I10 ESSENTIAL HYPERTENSION: ICD-10-CM

## 2024-09-27 DIAGNOSIS — Z23 NEEDS FLU SHOT: Primary | ICD-10-CM

## 2024-09-27 DIAGNOSIS — Z00.00 PREVENTATIVE HEALTH CARE: ICD-10-CM

## 2024-09-27 PROCEDURE — 99396 PREV VISIT EST AGE 40-64: CPT | Mod: S$GLB,,, | Performed by: INTERNAL MEDICINE

## 2024-09-27 PROCEDURE — 3044F HG A1C LEVEL LT 7.0%: CPT | Mod: CPTII,S$GLB,, | Performed by: INTERNAL MEDICINE

## 2024-09-27 PROCEDURE — 3074F SYST BP LT 130 MM HG: CPT | Mod: CPTII,S$GLB,, | Performed by: INTERNAL MEDICINE

## 2024-09-27 PROCEDURE — 1159F MED LIST DOCD IN RCRD: CPT | Mod: CPTII,S$GLB,, | Performed by: INTERNAL MEDICINE

## 2024-09-27 PROCEDURE — 1160F RVW MEDS BY RX/DR IN RCRD: CPT | Mod: CPTII,S$GLB,, | Performed by: INTERNAL MEDICINE

## 2024-09-27 PROCEDURE — 3008F BODY MASS INDEX DOCD: CPT | Mod: CPTII,S$GLB,, | Performed by: INTERNAL MEDICINE

## 2024-09-27 PROCEDURE — 90661 CCIIV3 VAC ABX FR 0.5 ML IM: CPT | Mod: S$GLB,,, | Performed by: INTERNAL MEDICINE

## 2024-09-27 PROCEDURE — 3078F DIAST BP <80 MM HG: CPT | Mod: CPTII,S$GLB,, | Performed by: INTERNAL MEDICINE

## 2024-09-27 PROCEDURE — G0008 ADMIN INFLUENZA VIRUS VAC: HCPCS | Mod: S$GLB,,, | Performed by: INTERNAL MEDICINE

## 2024-09-27 PROCEDURE — 99999 PR PBB SHADOW E&M-EST. PATIENT-LVL IV: CPT | Mod: PBBFAC,,, | Performed by: INTERNAL MEDICINE

## 2024-09-27 RX ORDER — LORAZEPAM 0.5 MG/1
0.5 TABLET ORAL DAILY PRN
Qty: 30 TABLET | Refills: 3 | Status: SHIPPED | OUTPATIENT
Start: 2024-09-27

## 2024-10-02 NOTE — PROGRESS NOTES
Patient ID: Paul Espinoza is a 44 y.o. male    Chief Complaint: Annual Exam    History of Present Illness  The patient is a 44-year-old male who presents for an annual checkup.    He has been practicing intermittent fasting, typically for 20 hours daily, but occasionally extending to 24 hours a few times a week. His diet primarily consists of sandwiches due to his work schedule, with occasional restaurant meals such as hamburgers. He usually eats around 2 or 3 PM and does not consume dinner. His diet includes meat, mustard, tomatoes, cheese, and wheat bread, while he avoids bananas. He also reports a decrease in his consumption of sweets. He denies any issues with swelling.    ROS: Otherwise Negative     Physical Exam  Vital Signs  Blood pressure reading today is 116/74.  General: Well-appearing, well-nourished.  No distress  HEENT: conjunctivae are normal.  Pupils are equal and reative to light.  TM's are clear and intact bilaterally.  Hearing is grossly normal.  Nasopharynx is clear.  Oropharynx is clear.  Neck: Supple.  No thyroid megaly.  No bruits.  Lymph: No cervical or supraclavicular adenopathy.  Heart: Regular rate and rhythm, without murmur, rub or gallop.  Lungs: Clear to auscultation; respiratory effort normal.  Abdomen: Soft, nontender, nondistended.  Normoactive bowel sounds.  No hepatomegaly.  No masses.  Extremities: Good distal pulses.  No edema.  Psych: Oriented to time person place.  Judgment and insight seem unimpaired.  Mood and affect are appropriate.  Results  Laboratory Studies  Complete metabolic panel shows normal sugar and kidney function. Liver enzymes slightly elevated due to fatty liver. Cholesterol levels are decent, with good cholesterol on the low side. Average blood sugar has decreased from 117 to 111. Thyroid and prostate numbers are normal. Blood count is fine, with a slightly lower white blood cell count, which is normal for the patient. Platelets are normal.    Assessment &  Plan  1. Annual Checkup.  Blood pressure is within the normal range at 116/74. The complete metabolic panel indicates normal glucose and kidney function. Liver enzymes are slightly elevated, likely due to fatty liver, but this is a decrease from previous levels. Cholesterol levels are satisfactory overall, although HDL cholesterol is slightly low. Average blood glucose levels have improved, decreasing from 117 last year to 111 currently. Thyroid and prostate parameters are within normal limits. Blood count is normal, with a slightly lower WBC count, which is typical for him. Platelet count is normal. He underwent colon cancer screening last year, including a colonoscopy, which was normal. He has achieved a weight loss of at least 20 pounds over the past year. He will receive the influenza vaccine today. He is advised to maintain a healthy diet and increase physical activity to enhance metabolism and promote fat burning. A Mediterranean diet rich in vegetables and lean proteins is recommended, along with reducing intake of sweets and fried foods. Prescriptions will be refilled and sent to [facility]. Cholesterol and A1c levels will be checked in 6 months.    2. Health Maintenance.  He will receive the influenza vaccine today.    Follow-up  Return in 6 months for a virtual follow-up visit.      Follow up in about 6 months (around 3/27/2025) for HTN cmp lipids, HEMOGLOBIN A1C.    Paul was seen today for annual exam.    Diagnoses and all orders for this visit:    Needs flu shot    Preventative health care  -     Hemoglobin A1C; Standing  -     influenza (Egg-FREE) (Flucelvax) 45 mcg/0.5 mL IM vaccine (> or = 6 mo) 0.5 mL  Healthcare maintenance performed, reviewed, updated and counseled today.  CHEMA (generalized anxiety disorder)  -     LORazepam (ATIVAN) 0.5 MG tablet; Take 1 tablet (0.5 mg total) by mouth daily as needed for Anxiety.  Controlled.  Continue current medical regimen.  Prescription refills addressed.  Followup  advised. See after visit summary.  Social phobia  -     LORazepam (ATIVAN) 0.5 MG tablet; Take 1 tablet (0.5 mg total) by mouth daily as needed for Anxiety.    Essential hypertension  -     Comprehensive Metabolic Panel; Standing  -     Lipid Panel; Standing  Controlled.  Continue current medical regimen.  Prescription refills addressed.  Followup advised. See after visit summary.  Fatty liver  Continued efforts at weight loss and lower fat and saturated fat intake       This note was generated with the assistance of ambient listening technology. Verbal consent was obtained by the patient and accompanying visitor(s) for the recording of patient appointment to facilitate this note. I attest to having reviewed and edited the generated note for accuracy, though some syntax or spelling errors may persist. Please contact the author of this note for any clarification.

## 2024-11-07 DIAGNOSIS — F41.1 GAD (GENERALIZED ANXIETY DISORDER): ICD-10-CM

## 2024-11-07 RX ORDER — ESCITALOPRAM OXALATE 20 MG/1
20 TABLET ORAL
Qty: 90 TABLET | Refills: 3 | Status: SHIPPED | OUTPATIENT
Start: 2024-11-07

## 2024-11-07 NOTE — TELEPHONE ENCOUNTER
Refill Decision Note   Paul Alexis  is requesting a refill authorization.  Brief Assessment and Rationale for Refill:  Approve     Medication Therapy Plan:         Comments:     Note composed:5:27 AM 11/07/2024

## 2024-11-07 NOTE — TELEPHONE ENCOUNTER
No care due was identified.  Health [facility] Embedded Care Due Messages. Reference number: [id number].   11/07/2024 12:14:59 AM CST

## 2025-03-21 ENCOUNTER — LAB VISIT (OUTPATIENT)
Dept: LAB | Facility: HOSPITAL | Age: 45
End: 2025-03-21
Attending: INTERNAL MEDICINE
Payer: COMMERCIAL

## 2025-03-21 DIAGNOSIS — I10 ESSENTIAL HYPERTENSION: ICD-10-CM

## 2025-03-21 LAB
ALBUMIN SERPL BCP-MCNC: 4.4 G/DL (ref 3.5–5.2)
ALP SERPL-CCNC: 50 U/L (ref 38–126)
ALT SERPL W/O P-5'-P-CCNC: 55 U/L (ref 10–44)
ANION GAP SERPL CALC-SCNC: 8 MMOL/L (ref 8–16)
AST SERPL-CCNC: 34 U/L (ref 15–46)
BILIRUB SERPL-MCNC: 0.4 MG/DL (ref 0.1–1)
CALCIUM SERPL-MCNC: 9.5 MG/DL (ref 8.7–10.5)
CHLORIDE SERPL-SCNC: 99 MMOL/L (ref 95–110)
CHOLEST SERPL-MCNC: 151 MG/DL (ref 120–199)
CHOLEST/HDLC SERPL: 4.6 {RATIO} (ref 2–5)
CO2 SERPL-SCNC: 32 MMOL/L (ref 23–29)
CREAT SERPL-MCNC: 1.18 MG/DL (ref 0.5–1.4)
EST. GFR  (NO RACE VARIABLE): >60 ML/MIN/1.73 M^2
GLUCOSE SERPL-MCNC: 101 MG/DL (ref 70–110)
HDLC SERPL-MCNC: 33 MG/DL (ref 40–75)
HDLC SERPL: 21.9 % (ref 20–50)
LDLC SERPL CALC-MCNC: 102 MG/DL (ref 63–159)
NONHDLC SERPL-MCNC: 118 MG/DL
POTASSIUM SERPL-SCNC: 4.2 MMOL/L (ref 3.5–5.1)
PROT SERPL-MCNC: 7.5 G/DL (ref 6–8.4)
SODIUM SERPL-SCNC: 139 MMOL/L (ref 136–145)
TRIGL SERPL-MCNC: 80 MG/DL (ref 30–150)
UUN UR-MCNC: 18 MG/DL (ref 2–20)

## 2025-03-21 PROCEDURE — 36415 COLL VENOUS BLD VENIPUNCTURE: CPT | Mod: PN | Performed by: INTERNAL MEDICINE

## 2025-03-21 PROCEDURE — 80061 LIPID PANEL: CPT | Performed by: INTERNAL MEDICINE

## 2025-03-21 PROCEDURE — 80053 COMPREHEN METABOLIC PANEL: CPT | Mod: PN | Performed by: INTERNAL MEDICINE

## 2025-03-26 ENCOUNTER — TELEPHONE (OUTPATIENT)
Dept: INTERNAL MEDICINE | Facility: CLINIC | Age: 45
End: 2025-03-26
Payer: COMMERCIAL

## 2025-03-26 NOTE — TELEPHONE ENCOUNTER
LVM for Pt to call office back. Called regarding virtual appt on Friday. Dr Martinez will not be in. Called to reschedule. Please transfer call to [phone] for scheduling.

## 2025-04-28 ENCOUNTER — OFFICE VISIT (OUTPATIENT)
Dept: INTERNAL MEDICINE | Facility: CLINIC | Age: 45
End: 2025-04-28
Payer: COMMERCIAL

## 2025-04-28 VITALS — DIASTOLIC BLOOD PRESSURE: 80 MMHG | SYSTOLIC BLOOD PRESSURE: 120 MMHG

## 2025-04-28 DIAGNOSIS — I10 ESSENTIAL HYPERTENSION: ICD-10-CM

## 2025-04-28 DIAGNOSIS — R73.9 HYPERGLYCEMIA: ICD-10-CM

## 2025-04-28 DIAGNOSIS — F40.10 SOCIAL PHOBIA: ICD-10-CM

## 2025-04-28 DIAGNOSIS — K76.0 FATTY LIVER: ICD-10-CM

## 2025-04-28 DIAGNOSIS — F41.1 GAD (GENERALIZED ANXIETY DISORDER): Primary | ICD-10-CM

## 2025-04-28 PROCEDURE — 1159F MED LIST DOCD IN RCRD: CPT | Mod: CPTII,95,, | Performed by: INTERNAL MEDICINE

## 2025-04-28 PROCEDURE — 1160F RVW MEDS BY RX/DR IN RCRD: CPT | Mod: CPTII,95,, | Performed by: INTERNAL MEDICINE

## 2025-04-28 PROCEDURE — 98006 SYNCH AUDIO-VIDEO EST MOD 30: CPT | Mod: 95,,, | Performed by: INTERNAL MEDICINE

## 2025-04-28 PROCEDURE — 3074F SYST BP LT 130 MM HG: CPT | Mod: CPTII,95,, | Performed by: INTERNAL MEDICINE

## 2025-04-28 PROCEDURE — 3079F DIAST BP 80-89 MM HG: CPT | Mod: CPTII,95,, | Performed by: INTERNAL MEDICINE

## 2025-04-28 RX ORDER — LORAZEPAM 0.5 MG/1
0.5 TABLET ORAL DAILY PRN
Qty: 30 TABLET | Refills: 3 | Status: SHIPPED | OUTPATIENT
Start: 2025-04-28

## 2025-05-06 NOTE — PROGRESS NOTES
Patient ID: Paul Espinoza is a 45 y.o. male    Chief Complaint: Follow-up    History of Present Illness    CHIEF COMPLAINT:  Patient presents for a follow-up visit to discuss recent lab results and medication refills.    HPI:  Patient reports well-controlled blood pressure, with the last check showing a reading of 120/80. He needs a refill for Lorazepam, which  in March. He inquires about his HDL cholesterol levels, which have been fluctuating between 33 and 41 over the past 5 years, with the most recent measurement being slightly low. Patient has a history of fatty liver, as evidenced by slightly elevated liver enzymes on recent labs.    Patient denies any specific symptoms or concerns.    MEDICATIONS:  Patient is on Lorazepam, with a refill requested as it  in March. He is also taking blood pressure medicines and Lexapro.    MEDICAL HISTORY:  Patient has a history of fatty liver, likely diagnosed based on elevated liver enzymes.    TEST RESULTS:  Patient underwent labs on . The results showed normal sugar levels, fine cholesterol numbers, and healthy kidneys. His liver enzymes were slightly elevated, consistent with fatty liver. Patient's HDL cholesterol has been fluctuating between 33 to 41 over the past 5 years, with the highest level of 41 recorded 5 years ago. Currently, his HDL cholesterol is low to normal.          ROS: Otherwise Negative     Physical Exam    General: Well-appearing, well-nourished.  No distress  HEENT: conjunctivae are normal.   Hearing is grossly normal.  Nasopharynx april congestion  Oropharynx is clear.  Neck: Supple.  Visually No thyroid megaly.   Heart: Regular rate   Lungs:  respiratory effort normal.  Abdomen:  nontender  Extremities:   No edema.  Psych: Oriented to time person place.  Judgment and insight seem unimpaired.  Mood and affect are appropriate.       Assessment & Plan    SEVERE OBESITY (BMI 35.0-39.9) WITH COMORBIDITY:  - Emphasized weight management  as the priority for overall health improvement.  - Recent evaluations show normal blood pressure (120/80) and labs from March 21st indicating normal sugar levels, healthy cholesterol numbers, and kidney function.  - Noted slightly elevated liver enzymes consistent with fatty liver and low HDL (good cholesterol), fluctuating between 33 to 41 over the past 5 years.  - Weight management is kramer to addressing these issues, particularly fatty liver and low cholesterol.    FATTY LIVER:  - Recommend healthy living practices to address this condition.    LIPOPROTEIN DEFICIENCY (CHOLESTEROL MANAGEMENT):  - Recommend dietary changes and increased physical activity to improve cholesterol levels.    HYPERTENSION:  - Continued current blood pressure medications.    DEPRESSION:  - Continued Lexapro medication for depression management.    ANXIETY:  - Refilled Lorazepam as requested by the patient for anxiety management.    GENERAL HEALTH AND LIFESTYLE RECOMMENDATIONS:  - Discussed importance of healthy living practices including exercise and balanced nutrition.  - Advised patient to watch starch and sugar intake, increase consumption of good protein and healthy vegetables, and avoid skipping meals.  - Suggested planning meals ahead and provided examples of healthy options such as boiled eggs, nuts, hummus with raw vegetables, lunch meat with cheese and avocado, Greek yogurt, salmon with vegetables, and spaghetti squash with meatballs.    FOLLOW-UP:  - Ordered labs for physical exam and scheduled follow-up visit in 6 months.            Follow up in about 6 months (around 10/28/2025) for physical cbc tsh cmp lipids +/- Psa, HEMOGLOBIN A1C.    Paul was seen today for follow-up.    Diagnoses and all orders for this visit:    CHEMA (generalized anxiety disorder)  -     LORazepam (ATIVAN) 0.5 MG tablet; Take 1 tablet (0.5 mg total) by mouth daily as needed for Anxiety.    Social phobia  -     LORazepam (ATIVAN) 0.5 MG tablet; Take 1  tablet (0.5 mg total) by mouth daily as needed for Anxiety.    Fatty liver  -     Comprehensive Metabolic Panel; Future  -     TSH; Future  -     Lipid Panel; Future  -     Hemoglobin A1C; Standing    Hyperglycemia  -     Hemoglobin A1C; Standing    Essential hypertension  -     CBC Auto Differential; Future  -     Comprehensive Metabolic Panel; Future  -     TSH; Future  -     Lipid Panel; Future  -     PSA, Screening; Future  -     Hemoglobin A1C; Standing     The patient location is: [facility]  The chief complaint leading to consultation is:  Anxiety fatty liver hypertension social phobia    Visit type: Audio Visual    Face to Face time with patient: 30 Minutes  30 minutes of total time spent on the encounter, which includes face to face time and non-face to face time preparing to see the patient (eg, review of tests), Obtaining and/or reviewing separately obtained history, Documenting clinical information in the electronic or other health record, Independently interpreting results (not separately reported) and communicating results to the patient/family/caregiver, or Care coordination (not separately reported).         Each patient to whom he or she provides medical services by telemedicine is:  (1) informed of the relationship between the physician and patient and the respective role of any other health care provider with respect to management of the patient; and (2) notified that he or she may decline to receive medical services by telemedicine and may withdraw from such care at any time.    Notes:       This note was generated with the assistance of ambient listening technology. Verbal consent was obtained by the patient and accompanying visitor(s) for the recording of patient appointment to facilitate this note. I attest to having reviewed and edited the generated note for accuracy, though some syntax or spelling errors may persist. Please contact the author of this note for any clarification.

## 2025-05-10 DIAGNOSIS — I10 ESSENTIAL HYPERTENSION: ICD-10-CM

## 2025-05-10 DIAGNOSIS — R61 HYPERHIDROSIS: ICD-10-CM

## 2025-05-10 NOTE — TELEPHONE ENCOUNTER
No care due was identified.  [facility] Embedded Care Due Messages. Reference number: [id number].   5/10/2025 8:20:37 AM CDT

## 2025-05-11 DIAGNOSIS — I10 ESSENTIAL HYPERTENSION: ICD-10-CM

## 2025-05-11 RX ORDER — TRIAMTERENE AND HYDROCHLOROTHIAZIDE 37.5; 25 MG/1; MG/1
1 TABLET ORAL
Qty: 90 TABLET | Refills: 3 | Status: SHIPPED | OUTPATIENT
Start: 2025-05-11

## 2025-05-11 RX ORDER — VERAPAMIL HCL 240 MG
240 TABLET, EXTENDED RELEASE ORAL
Qty: 90 TABLET | Refills: 3 | Status: SHIPPED | OUTPATIENT
Start: 2025-05-11

## 2025-05-11 RX ORDER — OXYBUTYNIN CHLORIDE 5 MG/1
5 TABLET, EXTENDED RELEASE ORAL
Qty: 90 TABLET | Refills: 3 | Status: SHIPPED | OUTPATIENT
Start: 2025-05-11

## 2025-05-11 NOTE — TELEPHONE ENCOUNTER
Refill Decision Note   Paul Alexis  is requesting a refill authorization.  Brief Assessment and Rationale for Refill:  Approve     Medication Therapy Plan:         Comments:     Note composed:9:06 AM 05/11/2025

## 2025-05-11 NOTE — TELEPHONE ENCOUNTER
No care due was identified.  Health [facility] Embedded Care Due Messages. Reference number: [id number].   5/11/2025 9:23:34 AM CDT   well developed, well nourished , in no acute distress , ambulating without difficulty , normal communication ability

## 2025-05-12 NOTE — TELEPHONE ENCOUNTER
Refill Decision Note   Paul Espinoza  is requesting a refill authorization.  Brief Assessment and Rationale for Refill:  Approve     Medication Therapy Plan:         Comments:     Note composed:10:06 PM 05/11/2025             Appointments     Last Visit   4/28/2025 Rosa Martinez MD   Next Visit   10/29/2025 Rosa Martinez MD

## 2025-05-15 ENCOUNTER — PATIENT MESSAGE (OUTPATIENT)
Dept: FAMILY MEDICINE | Facility: CLINIC | Age: 45
End: 2025-05-15
Payer: COMMERCIAL

## 2025-05-15 DIAGNOSIS — G47.30 SLEEP APNEA, UNSPECIFIED TYPE: Primary | ICD-10-CM

## 2025-05-22 ENCOUNTER — OFFICE VISIT (OUTPATIENT)
Dept: SLEEP MEDICINE | Facility: CLINIC | Age: 45
End: 2025-05-22
Payer: COMMERCIAL

## 2025-05-22 VITALS
DIASTOLIC BLOOD PRESSURE: 81 MMHG | SYSTOLIC BLOOD PRESSURE: 121 MMHG | WEIGHT: 265.88 LBS | HEART RATE: 54 BPM | BODY MASS INDEX: 35.08 KG/M2

## 2025-05-22 DIAGNOSIS — G47.30 SLEEP APNEA, UNSPECIFIED TYPE: ICD-10-CM

## 2025-05-22 DIAGNOSIS — E66.812 CLASS 2 SEVERE OBESITY WITH SERIOUS COMORBIDITY AND BODY MASS INDEX (BMI) OF 35.0 TO 35.9 IN ADULT, UNSPECIFIED OBESITY TYPE: Primary | ICD-10-CM

## 2025-05-22 DIAGNOSIS — E66.01 CLASS 2 SEVERE OBESITY WITH SERIOUS COMORBIDITY AND BODY MASS INDEX (BMI) OF 35.0 TO 35.9 IN ADULT, UNSPECIFIED OBESITY TYPE: Primary | ICD-10-CM

## 2025-05-22 PROCEDURE — 99999 PR PBB SHADOW E&M-EST. PATIENT-LVL III: CPT | Mod: PBBFAC,,,

## 2025-05-22 NOTE — PROGRESS NOTES
CC: Snoring and Apnea     Referred by Rosa Martinez MD for a sleep evaluation.     HPI     Subjective    HPI:  Sleep Apnea:  Symptoms:    [x] Loud snoring[x] Witnessed apneas [x] Gasping [x] Choking[x] Interrupted Sleep [x]  Nocturia [x]  Non refreshing sleep [] Excessive daytime sleepiness   []  Morning headaches [x] Nasal Congestion [] Dry Mouth [] Excessive Daytime Fatigue [] None  Duration:   [] Days  [] Months  [x] Years  Comments: patient reports loud snoring, witnessed apneas, gasping, choking, interrupted sleep, nocturia, non refreshing sleep, and nasal congestion. PMH significant for CHEMA, Obesity, Fatty Liver.     Wake up Feeling: [] Refreshed  [x] Non refreshed [] Occasionally Tired  Do You Take Naps: [] Yes [x] No Number of Naps:      The patient was screened and denies symptoms concerning: insomnia, RLS, parasomnias, and narcolepsy/IH    Daytime Impact:  Falling Asleep: at Work/School [] Yes [x] No   Falling Asleep While Driving: [] Yes [x] No   Interfering With:    [] Short term memory [] Concentration  [] Work  [x] Social life [] None        5/21/2025     6:54 PM   EPWORTH SLEEPINESS SCALE TOTAL SCORE    Total score 5        Patient-reported     (Validated Sleepiness Questionnaire with higher score indicating greater sleepiness (0-24)    STOP BANG Questionnaire  Patient diagnosed with Obstructive Sleep Apnea?: No  Has loud snoring: Yes  Disturbed sleep, daytime fatigue, daytime somnolence: Yes  Observed to have interrupted breathing during sleep: Yes  Takes medication for high blood pressure: Yes  Not taking BP medication but supposed to be: No  Recent BMI (Calculated): 34.9  Is BMI greater than 35 kg/m2?: 0=No  Age older than 50 years old?: 0=No  Has large neck size >40cm (15.7in., large male shirt size, large male collar size >16): Yes  Gender - Male: 1=Yes  STOP-Bang Total Score: 6  (Validated Stop Bang Questionnaire with higher score indicating greater risk of SARAI (0-2, 3-4, 5-8)) Risk:  "High        5/21/2025     6:59 PM   Sleep Clinic New Patient   What time do you go to bed on a week day? (Give a range) 8-9 pm   What time do you go to bed on a day off? (Give a range) 8-9 pm   How long does it take you to fall asleep? (Give a range) 30 mins   On average, how many times per night do you wake up? Once   How long does it take you to fall back into sleep? (Give a range) 5 mins   What time do you wake up to start your day on a week day? (Give a range) 4-4:30 am   What time do you wake up to start your day on a day off? (Give a range) 6-7 am   What time do you get out of bed? (Give a range) 6-7 am   On average, how many hours do you sleep? 7-8   On average, how many naps do you take per day? None   Rate your sleep quality from 0 to 5 (0-poor, 5-great). 3   Have you experienced:  Weight gain?   How much weight have you lost or gained (in lbs.) in the last year? ~5lbs   On average, how many times per night do you go to the bathroom?  1   Have you ever had a sleep study/CPAP machine/surgery for sleep apnea? No   Have you ever had a CPAP machine for sleep apnea? No   Have you ever had surgery for sleep apnea? No       Current Sleep Medication(s): None     Previous Sleep Medication(s): None     Sleep Factors:  Sleep Environment: Cool, Dark, and Comfortable  Caffeine: 2 beverages, last beverage at  11 am  Bed Partner: spouse, living together  Alcohol: 1 glasses of wine per week(s)  Sleep Disorder Family History: None          Objective    Records Reviewed:   Lab Results   Component Value Date    TSH 1.390 09/24/2024    CO2 32 (H) 03/21/2025    HGBA1C 5.5 09/24/2024      No echocardiogram results found for the past 12 months  Sleep Studies : None    Objective:  Vitals: Blood pressure 121/81, pulse (!) 54, weight 120.6 kg (265 lb 14 oz).   Exam:  Mallampati Score: IV (only hard palate visible)  Neck: Size:  18"  Gen: Well Appearing, demonstrates insight  Skin: No rash or lesions on bridge of nose or mouth      " "    Assessment & Plan  Sleep apnea, unspecified type  Diagnostic Testing: Home Sleep Apnea Test (HST) is indicated due to comorbid conditions: Obesity, Impaired Glucose Tolerance, and Anxiety with symptoms: snoring, gasping for air, choking , witnessed apneas, interrupted sleep, nocturia, restless sleep, and non refreshing sleep  Education & Treatment Options:  Discussed the etiology and consequences of untreated SARAI, including risks of cardiovascular disease, hypertension, stroke, metabolic dysfunction, and excessive daytime sleepiness.  Reviewed treatment options:  Positive Airway Pressure (PAP) therapy, Weight loss Positional therapy, Oral appliance therapy  Lifestyle Modifications: Advised weight management, alcohol reduction, and optimizing sleep hygiene.  Safety Precautions: Recommended avoiding driving if experiencing excessive daytime sleepiness.  Next Steps:  If SARAI is confirmed, patient agrees to trial APAP (wants to think about apap)   If PAP therapy is initiated, follow-up within 31-90 days to assess compliance and effectiveness.  Results will be communicated by Mychart   Orders:    Ambulatory referral/consult to Sleep Disorders    Home Sleep Study; Future    Class 2 severe obesity with serious comorbidity and body mass index (BMI) of 35.0 to 35.9 in adult, unspecified obesity type  Discussed possible treatment of SARAI and Obesity with Zepbound medication. Discussed common side effects and overall goals of treatment.   Education Provided.     Estimated body mass index is 35.08 kg/m² as calculated from the following:    Height as of 9/27/24: 6' 1" (1.854 m).    Weight as of this encounter: 120.6 kg (265 lb 14 oz).    For Adults 20 Years and Older:    BMI Weight Status   Below 18.5 Underweight   18.6-24.9 Normal/Healthy   25.0-29.9 Overweight   30.0 & Above Obese     Ideal Weight Range for Your Height: 6'1" = 144 - 188 lbs                            "

## 2025-05-22 NOTE — PATIENT INSTRUCTIONS
SLEEP LAB ([facility] or [facility]) will contact you to schedule the sleep study. Their number is [phone] (ext [phone]). Please call them if you do not hear from them in 2 weeks from now.  The [facility] is located on 7th floor of the [facility]; [facility] is located in [facility] ( 3rd floor [facility] Building).    SLEEP CLINIC (my assistant) will call you when the sleep study results are ready or I will message you through the portal with the results as we have discussed - if you have not heard from us by 2 weeks from the date of the study, or you can use [facility] to contact me.    Our clinic phone number is [phone] (ext 1)       You are advised to abstain from driving should you feel sleepy or drowsy.

## 2025-06-16 ENCOUNTER — HOSPITAL ENCOUNTER (OUTPATIENT)
Dept: SLEEP MEDICINE | Facility: HOSPITAL | Age: 45
Discharge: HOME | End: 2025-06-16
Attending: PSYCHIATRY & NEUROLOGY
Payer: COMMERCIAL

## 2025-06-16 DIAGNOSIS — G47.33 OSA (OBSTRUCTIVE SLEEP APNEA): Primary | ICD-10-CM

## 2025-06-16 DIAGNOSIS — G47.30 SLEEP APNEA, UNSPECIFIED TYPE: ICD-10-CM

## 2025-06-16 PROCEDURE — 95800 SLP STDY UNATTENDED: CPT

## 2025-06-16 NOTE — PROGRESS NOTES
Per physician orders, patient was given home sleep testing device and instructed on how to apply the device before going to bed tonight. I sized the device and showed the patient using a mirror how the device fits and what it should look like so they can use a mirror when putting it on themselves at home. We reviewed the instruction booklet. Patient verbalized understanding of the instructions and teach back complete. Patient was instructed to return the device the next day between the hours of 5:00am and 9:00am in the drop box that is located to the left and you walk into the main lobby of the hospital. I also educated the patient on sleep apnea, treatments options for sleep apnea, and what to expect after the home sleep study is complete.       Assessment: Neck Size: 17 1/2 inches        Mallampati Score: 4           [place] Sleepiness Scale Score: 4    Home Sleep Testing Device: [employer] GELY 620 - S/N: [id number]

## 2025-06-18 ENCOUNTER — PATIENT MESSAGE (OUTPATIENT)
Dept: SLEEP MEDICINE | Facility: CLINIC | Age: 45
End: 2025-06-18

## 2025-06-18 PROBLEM — G47.33 OSA (OBSTRUCTIVE SLEEP APNEA): Status: ACTIVE | Noted: 2025-06-18

## 2025-06-18 PROBLEM — G47.30 SLEEP APNEA: Status: ACTIVE | Noted: 2025-06-18

## 2025-06-18 NOTE — PROCEDURES
Patient Name Paul Espinoza Study Ordered By Sarath Wayne  Date of Night 1 06/16/2025 07:12:28 PM Date of Birth 1980  Identification Number [id number]  Overall AHI (4%)* % time < 90% Sp02 Mean Sp02 % time snoring > 30dB  31 1% 97.2% 5.3%  PHYSICIAN INTERPRETATION AND COMMENTS: Findings are consistent with severe obstructive sleep apnea (SARAI) (G47.33),  by overall AHI (apnea hypopnea index). This study was technically adequate to allow for interpretation.  CLINICAL HISTORY: 45 year old male presented with: 17.5 inch neck, BMI of 34.3, an [place] sleepiness score of 4, history of  hypertension and symptoms of nocturnal snoring and witnessed apneas. Based on the clinical history, the patient has a  high pre-test probability of having Severe SARAI.  SLEEP STUDY FINDINGS: Patient underwent a 1 night Home Sleep Test and by behavioral criteria, slept for approximately  6.52 hours, with a sleep latency of 6 minutes and a sleep efficiency of 94%. [place] sleep disordered breathing (AHI=31) is  noted based on a 4% hypopnea desaturation criteria. The patient slept supine 41.3% of the night based on valid recording  time of 6.4 hours and is 1.9 times as likely to have apneas/hypopneas when supine. The apneas/hypopneas are  accompanied by minimal oxygen desaturation (percent time below 90% SpO2: 1%, Min SpO2: 81.1%). The average  desaturation across all sleep disordered breathing events is 4.1%. Snoring occurs for 5.3% (30 dB) of the study, 3% is very  loud. The mean pulse rate is 55.6 BPM, with very frequent pulse rate variability (68 events with >= 6 BPM increase/decrease  per hour). RDI was 43/hr.  TREATMENT CONSIDERATIONS: Consider trial of Auto-titrating CPAP 6-20 cm, mask of patient's choice, and heated  humidification. If patient has difficulty with CPAP adherence or ongoing SARAI symptoms or despite CPAP adherence, then  consider an in-lab titration sleep study in order to determine optimal fixed CPAP  setting.  DISEASE MANAGEMENT CONSIDERATIONS: Definitive treatment for SARAI is recommended. Consider Sleep Clinic referral for  SARAI management.  Signature: Date: 06- 13:55:48 EST  Study Review: The raw data of this GELY study has been reviewed, with the report confirmed and electronically signed by Destinee Hameed, Read  and interpreted by Destinee Hameed MD, Diplomate, Sleep Medicine, ABPN.. Caution: The diagnosis of the Obstructive Sleep Apnea Syndrome must  be based on all available clinical data, of which this study is only a part. Thus final diagnosis and treatment recommendations should include  information from an examination of the patient by a knowledgeable healthcare provider.  *Average number of apneas and hypopneas (4%) per hour of valid recording time (Note: CMS RDI; [facility] SHANICE)  GELY Traceability: (SN: [id number] ) PatientStudyReportOutputGUID: [id number] ; v68

## 2025-06-23 ENCOUNTER — PATIENT MESSAGE (OUTPATIENT)
Dept: SLEEP MEDICINE | Facility: CLINIC | Age: 45
End: 2025-06-23
Payer: COMMERCIAL

## 2025-06-23 DIAGNOSIS — G47.33 OBSTRUCTIVE SLEEP APNEA: Primary | ICD-10-CM

## 2025-06-23 DIAGNOSIS — E66.01 SEVERE OBESITY (BMI 35.0-39.9) WITH COMORBIDITY: ICD-10-CM

## 2025-06-23 DIAGNOSIS — G47.33 SEVERE OBSTRUCTIVE SLEEP APNEA: Primary | ICD-10-CM

## 2025-06-23 RX ORDER — TIRZEPATIDE 2.5 MG/.5ML
2.5 INJECTION, SOLUTION SUBCUTANEOUS
Qty: 2 ML | Refills: 0 | Status: SHIPPED | OUTPATIENT
Start: 2025-06-23 | End: 2025-07-23

## 2025-08-18 ENCOUNTER — HOSPITAL ENCOUNTER (OUTPATIENT)
Facility: HOSPITAL | Age: 45
Discharge: HOME | End: 2025-08-18
Attending: PHYSICIAN ASSISTANT
Payer: COMMERCIAL

## 2025-08-18 ENCOUNTER — OFFICE VISIT (OUTPATIENT)
Dept: ORTHOPEDICS | Facility: CLINIC | Age: 45
End: 2025-08-18
Payer: COMMERCIAL

## 2025-08-18 VITALS — HEIGHT: 73 IN | BODY MASS INDEX: 35.44 KG/M2 | WEIGHT: 267.44 LBS

## 2025-08-18 DIAGNOSIS — M25.561 RIGHT KNEE PAIN, UNSPECIFIED CHRONICITY: ICD-10-CM

## 2025-08-18 DIAGNOSIS — M25.561 ACUTE PAIN OF RIGHT KNEE: ICD-10-CM

## 2025-08-18 DIAGNOSIS — M25.561 RIGHT KNEE PAIN, UNSPECIFIED CHRONICITY: Primary | ICD-10-CM

## 2025-08-18 DIAGNOSIS — M22.41 CHONDROMALACIA OF RIGHT PATELLA: Primary | ICD-10-CM

## 2025-08-18 PROCEDURE — 73564 X-RAY EXAM KNEE 4 OR MORE: CPT | Mod: TC,PN,RT

## 2025-08-18 PROCEDURE — 99203 OFFICE O/P NEW LOW 30 MIN: CPT | Mod: S$GLB,,, | Performed by: PHYSICIAN ASSISTANT

## 2025-08-18 PROCEDURE — 1159F MED LIST DOCD IN RCRD: CPT | Mod: CPTII,S$GLB,, | Performed by: PHYSICIAN ASSISTANT

## 2025-08-18 PROCEDURE — 73564 X-RAY EXAM KNEE 4 OR MORE: CPT | Mod: 26,RT,, | Performed by: RADIOLOGY

## 2025-08-18 PROCEDURE — 99999 PR PBB SHADOW E&M-EST. PATIENT-LVL III: CPT | Mod: PBBFAC,,, | Performed by: PHYSICIAN ASSISTANT

## 2025-08-18 PROCEDURE — 3008F BODY MASS INDEX DOCD: CPT | Mod: CPTII,S$GLB,, | Performed by: PHYSICIAN ASSISTANT
